# Patient Record
Sex: MALE | Race: WHITE | NOT HISPANIC OR LATINO | Employment: OTHER | URBAN - METROPOLITAN AREA
[De-identification: names, ages, dates, MRNs, and addresses within clinical notes are randomized per-mention and may not be internally consistent; named-entity substitution may affect disease eponyms.]

---

## 2023-09-06 PROBLEM — I21.9 MYOCARDIAL INFARCTION (MULTI): Status: ACTIVE | Noted: 2023-09-06

## 2023-09-06 PROBLEM — R09.89 ALTERED CARDIOPULMONARY TISSUE PERFUSION: Status: ACTIVE | Noted: 2023-09-06

## 2023-09-06 PROBLEM — I42.9 CARDIOMYOPATHY (MULTI): Status: ACTIVE | Noted: 2023-09-06

## 2023-09-06 PROBLEM — F31.9 BIPOLAR DISORDER (MULTI): Status: ACTIVE | Noted: 2020-03-25

## 2023-09-06 PROBLEM — N18.9 CKD (CHRONIC KIDNEY DISEASE): Status: ACTIVE | Noted: 2021-08-30

## 2023-09-06 PROBLEM — R06.02 SOB (SHORTNESS OF BREATH) ON EXERTION: Status: ACTIVE | Noted: 2019-07-15

## 2023-09-06 PROBLEM — E87.5 SERUM POTASSIUM ELEVATED: Status: ACTIVE | Noted: 2020-03-20

## 2023-09-06 PROBLEM — I50.9 CHRONIC CHF (MULTI): Status: ACTIVE | Noted: 2019-07-15

## 2023-09-06 PROBLEM — Z95.810 IMPLANTABLE CARDIOVERTER-DEFIBRILLATOR (ICD) IN SITU: Status: ACTIVE | Noted: 2023-09-06

## 2023-09-06 PROBLEM — I63.9 CEREBROVASCULAR ACCIDENT (MULTI): Status: ACTIVE | Noted: 2023-09-06

## 2023-09-06 PROBLEM — I25.5 ISCHEMIC CARDIOMYOPATHY: Status: ACTIVE | Noted: 2023-09-06

## 2023-09-06 PROBLEM — R13.10 DYSPHAGIA: Status: ACTIVE | Noted: 2018-10-09

## 2023-09-06 PROBLEM — E11.9 DIABETES MELLITUS (MULTI): Status: ACTIVE | Noted: 2018-07-18

## 2023-09-06 PROBLEM — R73.09 BLOOD GLUCOSE ABNORMAL: Status: ACTIVE | Noted: 2023-09-06

## 2023-09-06 PROBLEM — K59.00 CONSTIPATION: Status: ACTIVE | Noted: 2018-10-16

## 2023-09-06 PROBLEM — K42.9 UMBILICAL HERNIA: Status: ACTIVE | Noted: 2022-03-17

## 2023-09-06 PROBLEM — I48.91 ATRIAL FIBRILLATION (MULTI): Status: ACTIVE | Noted: 2023-09-06

## 2023-09-06 PROBLEM — I50.9 CONGESTIVE HEART FAILURE (MULTI): Status: ACTIVE | Noted: 2023-09-06

## 2023-09-06 PROBLEM — H61.22 IMPACTED CERUMEN, LEFT EAR: Status: ACTIVE | Noted: 2018-10-16

## 2023-09-06 PROBLEM — Z95.5 HISTORY OF CORONARY ARTERY STENT PLACEMENT: Status: ACTIVE | Noted: 2023-09-06

## 2023-09-06 PROBLEM — I10 HTN (HYPERTENSION): Status: ACTIVE | Noted: 2023-03-24

## 2023-09-06 PROBLEM — R06.00 DYSPNEA: Status: ACTIVE | Noted: 2023-09-06

## 2023-09-06 PROBLEM — Z86.73 HISTORY OF CEREBROVASCULAR ACCIDENT: Status: ACTIVE | Noted: 2023-09-06

## 2023-09-06 PROBLEM — E78.00 ELEVATED CHOLESTEROL: Status: ACTIVE | Noted: 2020-03-20

## 2023-09-06 PROBLEM — I25.10 CORONARY ARTERIOSCLEROSIS: Status: ACTIVE | Noted: 2023-09-06

## 2023-09-06 PROBLEM — G93.9 DISORDER OF BRAIN: Status: ACTIVE | Noted: 2023-09-06

## 2023-09-06 RX ORDER — TORSEMIDE 20 MG/1
20 TABLET ORAL EVERY OTHER DAY
COMMUNITY
Start: 2019-08-06 | End: 2023-11-14 | Stop reason: SDUPTHER

## 2023-09-06 RX ORDER — SACUBITRIL AND VALSARTAN 97; 103 MG/1; MG/1
TABLET, FILM COATED ORAL
COMMUNITY
Start: 2018-10-09 | End: 2023-10-30

## 2023-09-06 RX ORDER — SPIRONOLACTONE 25 MG/1
25 TABLET ORAL
COMMUNITY
End: 2023-10-30

## 2023-09-06 RX ORDER — LAMOTRIGINE 200 MG/1
200 TABLET ORAL EVERY MORNING
COMMUNITY
Start: 2018-10-09

## 2023-09-06 RX ORDER — CHOLECALCIFEROL (VITAMIN D3) 50 MCG
TABLET ORAL
COMMUNITY

## 2023-09-06 RX ORDER — ATORVASTATIN CALCIUM 80 MG/1
80 TABLET, FILM COATED ORAL NIGHTLY
COMMUNITY
Start: 2018-10-09 | End: 2023-11-14 | Stop reason: SDUPTHER

## 2023-09-06 RX ORDER — CARVEDILOL 25 MG/1
25 TABLET ORAL 2 TIMES DAILY
COMMUNITY
Start: 2018-10-09 | End: 2024-03-05 | Stop reason: SDUPTHER

## 2023-09-06 RX ORDER — WARFARIN 6 MG/1
6 TABLET ORAL EVERY EVENING
COMMUNITY
Start: 2018-10-09 | End: 2023-11-14 | Stop reason: SDUPTHER

## 2023-09-06 RX ORDER — HYDROCHLOROTHIAZIDE 25 MG/1
25 TABLET ORAL DAILY
COMMUNITY
End: 2023-10-30

## 2023-09-06 RX ORDER — SACUBITRIL AND VALSARTAN 49; 51 MG/1; MG/1
1 TABLET, FILM COATED ORAL 2 TIMES DAILY
COMMUNITY
End: 2024-02-06 | Stop reason: SDUPTHER

## 2023-09-06 RX ORDER — DIAPER,BRIEF,ADULT, DISPOSABLE
EACH MISCELLANEOUS 2 TIMES DAILY
COMMUNITY
Start: 2019-12-06

## 2023-09-06 RX ORDER — LAMOTRIGINE 100 MG/1
100 TABLET ORAL DAILY
COMMUNITY
End: 2023-10-30

## 2023-09-14 DIAGNOSIS — I48.91 ATRIAL FIBRILLATION, UNSPECIFIED TYPE (MULTI): Primary | ICD-10-CM

## 2023-09-14 DIAGNOSIS — Z79.01 LONG TERM (CURRENT) USE OF ANTICOAGULANTS: ICD-10-CM

## 2023-09-14 LAB
INR IN PPP BY COAGULATION ASSAY EXTERNAL: 2.4
PROTHROMBIN TIME (PT) IN PPP BY COAGULATION ASSAY EXTERNAL: NORMAL SECONDS

## 2023-10-17 ENCOUNTER — LAB (OUTPATIENT)
Dept: LAB | Facility: LAB | Age: 78
End: 2023-10-17
Payer: MEDICARE

## 2023-10-17 DIAGNOSIS — E11.9 TYPE 2 DIABETES MELLITUS WITHOUT COMPLICATIONS (MULTI): Primary | ICD-10-CM

## 2023-10-17 DIAGNOSIS — I10 ESSENTIAL (PRIMARY) HYPERTENSION: ICD-10-CM

## 2023-10-17 DIAGNOSIS — N18.9 CHRONIC KIDNEY DISEASE, UNSPECIFIED: ICD-10-CM

## 2023-10-17 LAB
ALBUMIN SERPL-MCNC: 4.3 G/DL (ref 3.5–5)
ALP BLD-CCNC: 71 U/L (ref 35–125)
ALT SERPL-CCNC: 26 U/L (ref 5–40)
ANION GAP SERPL CALC-SCNC: 13 MMOL/L
APPEARANCE UR: CLEAR
AST SERPL-CCNC: 27 U/L (ref 5–40)
BILIRUB SERPL-MCNC: 0.6 MG/DL (ref 0.1–1.2)
BILIRUB UR STRIP.AUTO-MCNC: NEGATIVE MG/DL
BUN SERPL-MCNC: 37 MG/DL (ref 8–25)
CALCIUM SERPL-MCNC: 9.5 MG/DL (ref 8.5–10.4)
CHLORIDE SERPL-SCNC: 106 MMOL/L (ref 97–107)
CO2 SERPL-SCNC: 28 MMOL/L (ref 24–31)
COLOR UR: ABNORMAL
CREAT SERPL-MCNC: 2.2 MG/DL (ref 0.4–1.6)
EST. AVERAGE GLUCOSE BLD GHB EST-MCNC: 186 MG/DL
GFR SERPL CREATININE-BSD FRML MDRD: 30 ML/MIN/1.73M*2
GLUCOSE SERPL-MCNC: 174 MG/DL (ref 65–99)
GLUCOSE UR STRIP.AUTO-MCNC: NORMAL MG/DL
HBA1C MFR BLD: 8.1 %
HOLD SPECIMEN: NORMAL
KETONES UR STRIP.AUTO-MCNC: NEGATIVE MG/DL
LEUKOCYTE ESTERASE UR QL STRIP.AUTO: ABNORMAL
NITRITE UR QL STRIP.AUTO: NEGATIVE
PH UR STRIP.AUTO: 6.5 [PH]
POTASSIUM SERPL-SCNC: 4.9 MMOL/L (ref 3.4–5.1)
PROT SERPL-MCNC: 6.2 G/DL (ref 5.9–7.9)
PROT UR STRIP.AUTO-MCNC: ABNORMAL MG/DL
RBC # UR STRIP.AUTO: NEGATIVE /UL
RBC #/AREA URNS AUTO: NORMAL /HPF
SODIUM SERPL-SCNC: 147 MMOL/L (ref 133–145)
SP GR UR STRIP.AUTO: 1.01
UROBILINOGEN UR STRIP.AUTO-MCNC: NORMAL MG/DL
WBC #/AREA URNS AUTO: NORMAL /HPF

## 2023-10-17 PROCEDURE — 80053 COMPREHEN METABOLIC PANEL: CPT

## 2023-10-17 PROCEDURE — 81001 URINALYSIS AUTO W/SCOPE: CPT

## 2023-10-17 PROCEDURE — 36415 COLL VENOUS BLD VENIPUNCTURE: CPT

## 2023-10-17 PROCEDURE — 87086 URINE CULTURE/COLONY COUNT: CPT

## 2023-10-17 PROCEDURE — 83036 HEMOGLOBIN GLYCOSYLATED A1C: CPT

## 2023-10-19 ENCOUNTER — ANTICOAGULATION - WARFARIN VISIT (OUTPATIENT)
Dept: CARDIOLOGY | Facility: CLINIC | Age: 78
End: 2023-10-19
Payer: MEDICARE

## 2023-10-19 DIAGNOSIS — I48.0 PAROXYSMAL ATRIAL FIBRILLATION (MULTI): Primary | ICD-10-CM

## 2023-10-19 PROBLEM — Z79.01 LONG TERM (CURRENT) USE OF ANTICOAGULANTS: Status: ACTIVE | Noted: 2023-10-19

## 2023-10-19 LAB — BACTERIA UR CULT: NORMAL

## 2023-10-20 ENCOUNTER — HOSPITAL ENCOUNTER (OUTPATIENT)
Dept: CARDIOLOGY | Facility: CLINIC | Age: 78
Discharge: HOME | End: 2023-10-20
Payer: MEDICARE

## 2023-10-20 PROCEDURE — 93295 DEV INTERROG REMOTE 1/2/MLT: CPT | Performed by: INTERNAL MEDICINE

## 2023-10-20 PROCEDURE — 93296 REM INTERROG EVL PM/IDS: CPT

## 2023-10-26 ENCOUNTER — ANTICOAGULATION - WARFARIN VISIT (OUTPATIENT)
Dept: CARDIOLOGY | Facility: CLINIC | Age: 78
End: 2023-10-26
Payer: MEDICARE

## 2023-10-26 DIAGNOSIS — Z79.01 LONG TERM (CURRENT) USE OF ANTICOAGULANTS: ICD-10-CM

## 2023-10-26 DIAGNOSIS — I48.0 PAROXYSMAL ATRIAL FIBRILLATION (MULTI): ICD-10-CM

## 2023-10-26 DIAGNOSIS — I48.91 ATRIAL FIBRILLATION, UNSPECIFIED TYPE (MULTI): ICD-10-CM

## 2023-10-26 LAB
POC INR: 2.5
POC PROTHROMBIN TIME: NORMAL

## 2023-10-26 PROCEDURE — 85610 PROTHROMBIN TIME: CPT | Mod: QW

## 2023-10-26 PROCEDURE — 99211 OFF/OP EST MAY X REQ PHY/QHP: CPT | Mod: PO,27 | Performed by: INTERNAL MEDICINE

## 2023-10-26 NOTE — PROGRESS NOTES
Patient identification verified with 2 identifiers.    Location: [facility] - [address] [place] [address] [phone]     Referring Physician: DR STAUFFER  Enrollment/ Re-enrollment date: 10/24   INR Goal: 2.0-3.0  INR monitoring is per [facility] protocol.  Anticoagulation Medication: warfarin  Indication: Atrial Fibrillation/Atrial Flutter    Subjective   Bleeding signs/symptoms: No    Bruising: No   Major bleeding event: No  Thrombosis signs/symptoms: No  Thromboembolic event: No  Missed doses: No  Extra doses: No  Medication changes: No  Dietary changes: No  Change in health: No  Change in activity: No  Alcohol: No  Other concerns: No    Upcoming Surgeries:  Does the Patient Have any upcoming surgeries that require interruption in anticoagulation therapy? no  Does the patient require bridging? no      Anticoagulation Summary  As of 10/26/2023      INR goal:  2.0-3.0   TTR:  --   INR used for dosing:     Weekly warfarin total:  42 mg               Assessment/Plan   Therapeutic     1. New dose: no change    2. Next INR: 1 month      Education provided to patient during the visit:  Patient instructed to call in interim with questions, concerns and changes.   Patient educated on signs of bleeding/clotting.

## 2023-10-30 ENCOUNTER — OFFICE VISIT (OUTPATIENT)
Dept: PRIMARY CARE | Facility: CLINIC | Age: 78
End: 2023-10-30
Payer: MEDICARE

## 2023-10-30 ENCOUNTER — HOSPITAL ENCOUNTER (OUTPATIENT)
Dept: CARDIOLOGY | Facility: CLINIC | Age: 78
Discharge: HOME | End: 2023-10-30
Payer: MEDICARE

## 2023-10-30 ENCOUNTER — TELEPHONE (OUTPATIENT)
Dept: PRIMARY CARE | Facility: CLINIC | Age: 78
End: 2023-10-30

## 2023-10-30 VITALS
HEART RATE: 72 BPM | BODY MASS INDEX: 27.26 KG/M2 | SYSTOLIC BLOOD PRESSURE: 120 MMHG | DIASTOLIC BLOOD PRESSURE: 60 MMHG | OXYGEN SATURATION: 97 % | WEIGHT: 190 LBS | RESPIRATION RATE: 16 BRPM

## 2023-10-30 DIAGNOSIS — I25.5 ISCHEMIC CARDIOMYOPATHY: ICD-10-CM

## 2023-10-30 DIAGNOSIS — I48.0 PAROXYSMAL ATRIAL FIBRILLATION (MULTI): ICD-10-CM

## 2023-10-30 DIAGNOSIS — E11.9 TYPE 2 DIABETES MELLITUS WITHOUT COMPLICATION, WITHOUT LONG-TERM CURRENT USE OF INSULIN (MULTI): ICD-10-CM

## 2023-10-30 DIAGNOSIS — E78.00 ELEVATED CHOLESTEROL: ICD-10-CM

## 2023-10-30 DIAGNOSIS — I48.0 AF (PAROXYSMAL ATRIAL FIBRILLATION) (MULTI): ICD-10-CM

## 2023-10-30 DIAGNOSIS — I10 PRIMARY HYPERTENSION: ICD-10-CM

## 2023-10-30 DIAGNOSIS — F31.60 BIPOLAR AFFECTIVE DISORDER, CURRENT EPISODE MIXED, CURRENT EPISODE SEVERITY UNSPECIFIED (MULTI): Primary | ICD-10-CM

## 2023-10-30 DIAGNOSIS — N18.31 STAGE 3A CHRONIC KIDNEY DISEASE (MULTI): ICD-10-CM

## 2023-10-30 DIAGNOSIS — Z95.810 ICD (IMPLANTABLE CARDIOVERTER-DEFIBRILLATOR) IN PLACE: ICD-10-CM

## 2023-10-30 PROBLEM — R06.00 DYSPNEA: Status: RESOLVED | Noted: 2023-09-06 | Resolved: 2023-10-30

## 2023-10-30 PROBLEM — R09.89 ALTERED CARDIOPULMONARY TISSUE PERFUSION: Status: RESOLVED | Noted: 2023-09-06 | Resolved: 2023-10-30

## 2023-10-30 PROBLEM — R13.10 DYSPHAGIA: Status: RESOLVED | Noted: 2018-10-09 | Resolved: 2023-10-30

## 2023-10-30 PROBLEM — I50.9 CONGESTIVE HEART FAILURE (MULTI): Status: RESOLVED | Noted: 2023-09-06 | Resolved: 2023-10-30

## 2023-10-30 PROBLEM — E87.5 SERUM POTASSIUM ELEVATED: Status: RESOLVED | Noted: 2020-03-20 | Resolved: 2023-10-30

## 2023-10-30 PROBLEM — I21.9 MYOCARDIAL INFARCTION (MULTI): Status: RESOLVED | Noted: 2023-09-06 | Resolved: 2023-10-30

## 2023-10-30 PROBLEM — R73.09 BLOOD GLUCOSE ABNORMAL: Status: RESOLVED | Noted: 2023-09-06 | Resolved: 2023-10-30

## 2023-10-30 PROBLEM — H61.22 IMPACTED CERUMEN, LEFT EAR: Status: RESOLVED | Noted: 2018-10-16 | Resolved: 2023-10-30

## 2023-10-30 PROBLEM — G93.9 DISORDER OF BRAIN: Status: RESOLVED | Noted: 2023-09-06 | Resolved: 2023-10-30

## 2023-10-30 PROBLEM — R06.02 SOB (SHORTNESS OF BREATH) ON EXERTION: Status: RESOLVED | Noted: 2019-07-15 | Resolved: 2023-10-30

## 2023-10-30 PROBLEM — Z86.73 HISTORY OF CEREBROVASCULAR ACCIDENT: Status: RESOLVED | Noted: 2023-09-06 | Resolved: 2023-10-30

## 2023-10-30 PROCEDURE — 3074F SYST BP LT 130 MM HG: CPT | Performed by: FAMILY MEDICINE

## 2023-10-30 PROCEDURE — 1126F AMNT PAIN NOTED NONE PRSNT: CPT | Performed by: FAMILY MEDICINE

## 2023-10-30 PROCEDURE — 3078F DIAST BP <80 MM HG: CPT | Performed by: FAMILY MEDICINE

## 2023-10-30 PROCEDURE — 1036F TOBACCO NON-USER: CPT | Performed by: FAMILY MEDICINE

## 2023-10-30 PROCEDURE — 93296 REM INTERROG EVL PM/IDS: CPT

## 2023-10-30 PROCEDURE — 1159F MED LIST DOCD IN RCRD: CPT | Performed by: FAMILY MEDICINE

## 2023-10-30 PROCEDURE — 99214 OFFICE O/P EST MOD 30 MIN: CPT | Performed by: FAMILY MEDICINE

## 2023-10-30 RX ORDER — DAPAGLIFLOZIN 10 MG/1
10 TABLET, FILM COATED ORAL DAILY
Qty: 30 TABLET | Refills: 3 | Status: SHIPPED | OUTPATIENT
Start: 2023-10-30 | End: 2024-10-29

## 2023-10-30 ASSESSMENT — PATIENT HEALTH QUESTIONNAIRE - PHQ9
SUM OF ALL RESPONSES TO PHQ9 QUESTIONS 1 AND 2: 0
2. FEELING DOWN, DEPRESSED OR HOPELESS: NOT AT ALL
1. LITTLE INTEREST OR PLEASURE IN DOING THINGS: NOT AT ALL

## 2023-10-30 ASSESSMENT — ENCOUNTER SYMPTOMS
LOSS OF SENSATION IN FEET: 0
OCCASIONAL FEELINGS OF UNSTEADINESS: 0
DEPRESSION: 0

## 2023-10-30 ASSESSMENT — PAIN SCALES - GENERAL: PAINLEVEL: 0-NO PAIN

## 2023-10-30 NOTE — PROGRESS NOTES
Subjective   Patient ID: Lj Valenzuela is a 78 y.o. male who presents for Diabetes.    HPI   1. LJ is seen today for bipolar disorder.  He is on Lamotrigine. He is followed by Dr. Ruiz.     2. LJ is seen also for follow up of Diabetes 2, diet controlled.  He is on no meds. Recent A1C is 8.1.     3. LJ is seen today also for follow up of High cholesterol.  He is on atorvastatin 80 mg.      4. LJ is here also for follow up of CHF and essential hypertension.  He is on spironolactone, carvedilol, torsemide and entresto  through Dr. Whitaker . He saw Dr. Cox (Defibrillator placed in 11/14).      5. LJ is seen today also for follow up of coronary artery disease.  He had a stent placed by Dr. Medrano in 7/08. He had an MI in 5/10 (admitted at [facility]). He is on Coumadin     6. LJ is seen today also for tremors.  He has had a tremor for several years that has become more noticeable over the past few years. He saw Dr. Zapata and Dr. Ruiz (psychiatrist) who stopped his lithium and started him on Lamictal.  He had a hemorrhagic stroke in 6/16 and treated at [facility]. He had been on Coumadin which was stopped and subsequently restarted. He has had no residual effects.     7. LJ is seen today for renal insufficiency His Creatinine has chronically been 1.6 - 2.0.  Most recent CREAT is 2.2.    Review of Systems  Denies headache, blurred vision, chest pain, shortness of breath, nausea or vomiting, change in bowel habits or leg pain or swelling.    Objective   /60 (BP Location: Left arm, Patient Position: Sitting, BP Cuff Size: Large adult)   Pulse 72   Resp 16   Wt 86.2 kg (190 lb)   SpO2 97%   BMI 27.26 kg/m²     Physical Exam  Vitals and nurse's notes reviewed  General: no acute distress  HEENT: Normal  Neck: Supple  Lungs: Clear  Cardio: RRR w/o murmur  Extremities: No edema, no calf tenderness  Neuro: Alert and oriented with no focal deficits    Assessment/Plan   Problem List  Items Addressed This Visit             ICD-10-CM       High    HTN (hypertension) I10     Continue current medication and following with his cardiologist.         Atrial fibrillation (CMS/Formerly [facility]) I48.91     Continue current medication and following with cardiologist.         Bipolar disorder (CMS/Formerly [facility]) - Primary F31.9     Continue current medication and following with his psychiatrist.         Cardiomyopathy (CMS/Formerly [facility]) I42.9    CKD (chronic kidney disease) N18.9     Creatinine has progressed to be higher than in the past.  Will refer to nephrologist Dr. Figueredo for further evaluation.         Relevant Orders    Referral to Nephrology    Diabetes mellitus (CMS/Formerly [facility]) E11.9     We discussed his elevated sugar in detail.  He admits to eating sweets.  I recommend he cut back on this.  Also will start Farxiga 10 daily.  Follow-up here in 3 months for recheck at which time we will recheck A1c.         Relevant Medications    dapagliflozin propanediol (Farxiga) 10 mg    Elevated cholesterol E78.00     Continue current medication.  We will check lipid panel in 6 months [place] [facility]

## 2023-10-30 NOTE — ASSESSMENT & PLAN NOTE
Creatinine has progressed to be higher than in the past.  Will refer to nephrologist Dr. Figueredo for further evaluation.

## 2023-10-30 NOTE — ASSESSMENT & PLAN NOTE
We discussed his elevated sugar in detail.  He admits to eating sweets.  I recommend he cut back on this.  Also will start Farxiga 10 daily.  Follow-up here in 3 months for recheck at which time we will recheck A1c.

## 2023-11-14 ENCOUNTER — OFFICE VISIT (OUTPATIENT)
Dept: CARDIOLOGY | Facility: CLINIC | Age: 78
End: 2023-11-14
Payer: MEDICARE

## 2023-11-14 VITALS
OXYGEN SATURATION: 96 % | HEART RATE: 65 BPM | TEMPERATURE: 98.6 F | WEIGHT: 186 LBS | RESPIRATION RATE: 14 BRPM | SYSTOLIC BLOOD PRESSURE: 110 MMHG | DIASTOLIC BLOOD PRESSURE: 80 MMHG | HEIGHT: 70 IN | BODY MASS INDEX: 26.63 KG/M2

## 2023-11-14 DIAGNOSIS — I48.0 PAROXYSMAL ATRIAL FIBRILLATION (MULTI): ICD-10-CM

## 2023-11-14 DIAGNOSIS — E78.2 MIXED HYPERLIPIDEMIA: ICD-10-CM

## 2023-11-14 DIAGNOSIS — I42.9 SECONDARY CARDIOMYOPATHY (MULTI): ICD-10-CM

## 2023-11-14 DIAGNOSIS — I51.3 LEFT VENTRICULAR THROMBUS: ICD-10-CM

## 2023-11-14 DIAGNOSIS — R06.02 SOB (SHORTNESS OF BREATH) ON EXERTION: ICD-10-CM

## 2023-11-14 DIAGNOSIS — I50.9 CHF (CONGESTIVE HEART FAILURE) (MULTI): Primary | ICD-10-CM

## 2023-11-14 PROBLEM — I50.22 CHRONIC SYSTOLIC HEART FAILURE (MULTI): Status: ACTIVE | Noted: 2023-09-06

## 2023-11-14 PROBLEM — I48.20 CHRONIC ATRIAL FIBRILLATION (MULTI): Status: ACTIVE | Noted: 2023-09-06

## 2023-11-14 PROBLEM — I20.9 ANGINA PECTORIS (CMS-HCC): Status: ACTIVE | Noted: 2023-09-06

## 2023-11-14 PROCEDURE — 1159F MED LIST DOCD IN RCRD: CPT | Performed by: INTERNAL MEDICINE

## 2023-11-14 PROCEDURE — 99214 OFFICE O/P EST MOD 30 MIN: CPT | Performed by: INTERNAL MEDICINE

## 2023-11-14 PROCEDURE — 3074F SYST BP LT 130 MM HG: CPT | Performed by: INTERNAL MEDICINE

## 2023-11-14 PROCEDURE — 1036F TOBACCO NON-USER: CPT | Performed by: INTERNAL MEDICINE

## 2023-11-14 PROCEDURE — 1126F AMNT PAIN NOTED NONE PRSNT: CPT | Performed by: INTERNAL MEDICINE

## 2023-11-14 PROCEDURE — 3079F DIAST BP 80-89 MM HG: CPT | Performed by: INTERNAL MEDICINE

## 2023-11-14 RX ORDER — TORSEMIDE 20 MG/1
20 TABLET ORAL 2 TIMES DAILY
Qty: 60 TABLET | Refills: 11 | Status: SHIPPED | OUTPATIENT
Start: 2023-11-14 | End: 2024-11-13

## 2023-11-14 RX ORDER — ATORVASTATIN CALCIUM 80 MG/1
80 TABLET, FILM COATED ORAL NIGHTLY
Qty: 90 TABLET | Refills: 3 | Status: SHIPPED | OUTPATIENT
Start: 2023-11-14 | End: 2024-11-13

## 2023-11-14 RX ORDER — FAMOTIDINE 20 MG/1
20 TABLET, FILM COATED ORAL NIGHTLY PRN
COMMUNITY
End: 2024-02-08 | Stop reason: ALTCHOICE

## 2023-11-14 RX ORDER — WARFARIN 6 MG/1
6 TABLET ORAL EVERY EVENING
Qty: 90 TABLET | Refills: 3 | Status: SHIPPED | OUTPATIENT
Start: 2023-11-14 | End: 2024-11-13

## 2023-11-14 ASSESSMENT — PAIN SCALES - GENERAL: PAINLEVEL: 0-NO PAIN

## 2023-11-14 NOTE — PROGRESS NOTES
History of present illness:  78 year old male patient here today following up on hx of ischemic Cardiomyopathy/AFIB, and heart failure with systolic dysfunction. LHC in 2008 showed severe RCA disease which was treated with DELIA, 2D Echo on 2019 showed EF of 30% status post ICD. (NYHA class I-II). Cardiac catheterization in 10/2019 showed stable CAD. Patient returns to my office for follow-up.  Office for follow-up.  Has been feeling overall great since we increase his torsemide to 20 mg oral twice daily.  Has been also on Farxiga for better sugar control.  Denies any chest pain palpitations or dizziness.  Still has slight mild lower extremity edema.  Past Medical History:   Diagnosis Date    AICD (automatic cardioverter/defibrillator) present 05/29/2016    Formatting of this note might be different from the original. Patient reports that this was placed and he does have history of atrial fibrillation.    Angina pectoris (CMS/Formerly [facility]) 09/06/2023    Basal ganglia hemorrhage (CMS/[facility]ly [facility]) 05/29/2016    Formatting of this note might be different from the original. - L sided subcortical hemorrhage. ICH score of 0. - Etiology is hypertensive and coagulopathy. - Repeat CT scan in 6 hours for stability.    Chronic atrial fibrillation (CMS/[facility]ly [facility]) 09/06/2023    Chronic systolic heart failure (CMS/[facility]ly [facility]) 09/06/2023    CKD (chronic kidney disease) 08/30/2021    Coronary artery disease 05/29/2016    Diabetes mellitus (CMS/[facility]ly [facility]) 07/18/2018    Essential hypertension 03/24/2023    History of coronary artery stent placement 09/06/2023    Left ventricular thrombus 06/01/2016    Formatting of this note might be different from the original. 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 His smoking use included cigarettes. He smoked an average of 1 pack per day. He has been exposed to tobacco smoke. He has never used smokeless tobacco. He reports that he does not currently use alcohol. He reports that he does not use drugs.    Family History   Problem Relation Name Age of Onset    Other (accident) Father         Patient's Medications   New Prescriptions    No medications on file   Previous Medications    ATORVASTATIN (LIPITOR) 80 MG TABLET    Take 1 tablet (80 mg) by mouth once daily at bedtime.    BLOOD SUGAR DIAGNOSTIC (TRUETRACK TEST) STRIP    2 times a day.    CARVEDILOL (COREG) 25 MG TABLET    Take 1 tablet (25 mg) by mouth 2 times a day.    CHOLECALCIFEROL (VITAMIN D3) 50 MCG (2000 UT) TABLET        DAPAGLIFLOZIN PROPANEDIOL (FARXIGA) 10 MG    Take 1 tablet (10 mg) by mouth once daily.    EMPAGLIFLOZIN (JARDIANCE) 10 MG    Take 1 tablet (10 mg) by mouth once daily.    FAMOTIDINE (PEPCID) 20 MG TABLET    Take 1 tablet (20 mg) by mouth as needed at bedtime.    LAMOTRIGINE (LAMICTAL) 200 MG TABLET    Take 1 tablet (200 mg) by mouth once daily in the morning.    RANITIDINE HCL ORAL    Take 150 mg by mouth once daily at bedtime.    SACUBITRIL-VALSARTAN (ENTRESTO) 49-51 MG TABLET    Take 1 tablet by mouth 2 times a day.    TORSEMIDE (DEMADEX) 20 MG TABLET    Take 1 tablet (20 mg) by mouth every other day.    WARFARIN (JANTOVEN) 6 MG TABLET    Take 1 tablet (6 mg) by mouth once daily in the evening.   Modified Medications    No medications on file   Discontinued Medications    No medications on file       Objective   Physical Exam  General: Patient in no acute distress   HEENT: Atraumatic normocephalic.  Neck: Supple, jugular venous pressure within normal limit.  No  bruits  Lungs: Clear to auscultation bilaterally  Cardiovascular: Regular rate and rhythm, normal heart sounds, no murmurs rubs or gallops  Abdomen: Soft nontender nondistended.  Normal bowel sounds.  Extremities: Warm to touch, no edema.      Lab Review   Anticoagulation - Warfarin Visit on 10/26/2023   Component Date Value    POC INR 10/26/2023 2.50    Lab on 10/17/2023   Component Date Value    Color, Urine 10/17/2023 Light-Yellow     Appearance, Urine 10/17/2023 Clear     Specific Gravity, Urine 10/17/2023 1.010     pH, Urine 10/17/2023 6.5     Protein, Urine 10/17/2023 20 (TRACE)     Glucose, Urine 10/17/2023 Normal     Blood, Urine 10/17/2023 NEGATIVE     Ketones, Urine 10/17/2023 NEGATIVE     Bilirubin, Urine 10/17/2023 NEGATIVE     Urobilinogen, Urine 10/17/2023 Normal     Nitrite, Urine 10/17/2023 NEGATIVE     Leukocyte Esterase, Urine 10/17/2023 25 Edis/µL (A)     Extra Tube 10/17/2023 Hold for add-ons.     Glucose 10/17/2023 174 (H)     Sodium 10/17/2023 147 (H)     Potassium 10/17/2023 4.9     Chloride 10/17/2023 106     Bicarbonate 10/17/2023 28     Urea Nitrogen 10/17/2023 37 (H)     Creatinine 10/17/2023 2.20 (H)     eGFR 10/17/2023 30 (L)     Calcium 10/17/2023 9.5     Albumin 10/17/2023 4.3     Alkaline Phosphatase 10/17/2023 71     Total Protein 10/17/2023 6.2     AST 10/17/2023 27     Bilirubin, Total 10/17/2023 0.6     ALT 10/17/2023 26     Anion Gap 10/17/2023 13     Hemoglobin A1C 10/17/2023 8.1 (H)     Estimated Average Glucose 10/17/2023 186     WBC, Urine 10/17/2023 NONE     RBC, Urine 10/17/2023 1-2     Urine Culture 10/17/2023 No significant growth         Assessment/Plan   Patient Active Problem List   Diagnosis    Essential hypertension    Chronic atrial fibrillation (CMS/HCC)    Bipolar disorder (CMS/HCC)    Secondary cardiomyopathy (CMS/HCC)    Stroke (cerebrum) (CMS/HCC)    Chronic CHF (CMS/HCC)    Chronic systolic heart failure (CMS/HCC)    CKD (chronic kidney disease)    Constipation     Coronary artery disease    Diabetes mellitus (CMS/HCC)    Mixed hyperlipidemia    History of coronary artery stent placement    AICD (automatic cardioverter/defibrillator) present    Ischemic cardiomyopathy    Angina pectoris (CMS/HCC)    Umbilical hernia    Long term (current) use of anticoagulants    Paroxysmal atrial fibrillation (CMS/HCC)    Basal ganglia hemorrhage (CMS/HCC)    Dysarthria    Left ventricular thrombus        78 year old male patient here today following up on hx of ischemic Cardiomyopathy/AFIB, and heart failure with systolic dysfunction. LHC in 2008 showed severe RCA disease which was treated with DELIA, 2D Echo on 2019 showed EF of 30% status post ICD. (NYHA class I-II). Cardiac catheterization in 10/2019 showed stable CAD. Patient returns to my office for follow-up.  Office for follow-up.  Has been feeling overall great since we increase his torsemide to 20 mg oral twice daily.  Has been also on Farxiga for better sugar control.  Denies any chest pain palpitations or dizziness.  Still has slight mild lower extremity edema.  Reviewed last labs his creatinine is 2.2 potassium stable.  He is supposed to see Dr. Vidales and few weeks.  He has ordered repeated blood work before seeing him.  From my standpoint he is actually on optimal medical therapy we will continue current medications we will follow-up in 3 months.  Patient otherwise stable.    Rosamaria Seymour MD

## 2023-11-30 ENCOUNTER — ANTICOAGULATION - WARFARIN VISIT (OUTPATIENT)
Dept: CARDIOLOGY | Facility: CLINIC | Age: 78
End: 2023-11-30
Payer: MEDICARE

## 2023-11-30 DIAGNOSIS — Z79.01 LONG TERM (CURRENT) USE OF ANTICOAGULANTS: ICD-10-CM

## 2023-11-30 DIAGNOSIS — I48.0 PAROXYSMAL ATRIAL FIBRILLATION (MULTI): ICD-10-CM

## 2023-11-30 DIAGNOSIS — I48.20 CHRONIC ATRIAL FIBRILLATION (MULTI): ICD-10-CM

## 2023-11-30 LAB
POC INR: 3.4
POC PROTHROMBIN TIME: NORMAL

## 2023-11-30 PROCEDURE — 99211 OFF/OP EST MAY X REQ PHY/QHP: CPT | Performed by: INTERNAL MEDICINE

## 2023-11-30 PROCEDURE — 85610 PROTHROMBIN TIME: CPT | Mod: QW

## 2023-11-30 NOTE — PROGRESS NOTES
Patient identification verified with 2 identifiers.    Location: [facility] - [address] [place] [address] [phone]     Referring Physician: DR STAUFFER  Enrollment/ Re-enrollment date: 10/24   INR Goal: 2.0-3.0  INR monitoring is per [facility] protocol.  Anticoagulation Medication: warfarin  Indication: Atrial Fibrillation/Atrial Flutter    Subjective   Bleeding signs/symptoms: No    Bruising: No   Major bleeding event: No  Thrombosis signs/symptoms: No  Thromboembolic event: No  Missed doses: No  Extra doses: No  Medication changes: No  Dietary changes: Yes  decrease in K  Change in health: No  Change in activity: No  Alcohol: No  Other concerns: No    Upcoming Surgeries:  Does the Patient Have any upcoming surgeries that require interruption in anticoagulation therapy? no  Does the patient require bridging? no      Anticoagulation Summary  As of 11/30/2023      INR goal:  2.0-3.0   TTR:  76.9 % (2.2 mo)   INR used for dosing:  3.40 (11/30/2023)   Weekly warfarin total:  42 mg               Assessment/Plan     1. New dose: no change    2. Next INR: 2 weeks      Education provided to patient during the visit:  Patient instructed to call in interim with questions, concerns and changes.   Patient educated on signs of bleeding/clotting.

## 2023-12-14 ENCOUNTER — APPOINTMENT (OUTPATIENT)
Dept: CARDIOLOGY | Facility: CLINIC | Age: 78
End: 2023-12-14
Payer: MEDICARE

## 2023-12-18 ENCOUNTER — ANTICOAGULATION - WARFARIN VISIT (OUTPATIENT)
Dept: CARDIOLOGY | Facility: CLINIC | Age: 78
End: 2023-12-18
Payer: MEDICARE

## 2023-12-18 DIAGNOSIS — I48.20 CHRONIC ATRIAL FIBRILLATION (MULTI): ICD-10-CM

## 2023-12-18 DIAGNOSIS — Z79.01 LONG TERM (CURRENT) USE OF ANTICOAGULANTS: ICD-10-CM

## 2023-12-18 DIAGNOSIS — I48.0 PAROXYSMAL ATRIAL FIBRILLATION (MULTI): ICD-10-CM

## 2023-12-18 LAB
POC INR: 2.6
POC PROTHROMBIN TIME: NORMAL

## 2023-12-18 PROCEDURE — 99211 OFF/OP EST MAY X REQ PHY/QHP: CPT | Mod: 27 | Performed by: INTERNAL MEDICINE

## 2023-12-18 PROCEDURE — 85610 PROTHROMBIN TIME: CPT | Mod: QW

## 2023-12-18 NOTE — PROGRESS NOTES
Patient identification verified with 2 identifiers.    Location: [facility] - [address] [place] [address] [phone]     Referring Physician: DR STAUFFER  Enrollment/ Re-enrollment date: 10/24   INR Goal: 2.0-3.0  INR monitoring is per [facility] protocol.  Anticoagulation Medication: warfarin  Indication: Atrial Fibrillation/Atrial Flutter    Subjective   Bleeding signs/symptoms: No    Bruising: No   Major bleeding event: No  Thrombosis signs/symptoms: No  Thromboembolic event: No  Missed doses: No  Extra doses: No  Medication changes: No  Dietary changes: No  Change in health: No  Change in activity: No  Alcohol: No  Other concerns: No    Upcoming Surgeries:  Does the Patient Have any upcoming surgeries that require interruption in anticoagulation therapy? no  Does the patient require bridging? no      Anticoagulation Summary  As of 2023      INR goal:  2.0-3.0   TTR:  71.2 % (2.8 mo)   INR used for dosin.60 (2023)   Weekly warfarin total:  42 mg               Assessment/Plan     1. New dose: no change    2. Next INR: 4 weeks      Education provided to patient during the visit:  Patient instructed to call in interim with questions, concerns and changes.   Patient educated on signs of bleeding/clotting.

## 2024-01-09 ENCOUNTER — APPOINTMENT (OUTPATIENT)
Dept: CARDIOLOGY | Facility: CLINIC | Age: 79
End: 2024-01-09
Payer: MEDICARE

## 2024-01-15 ENCOUNTER — ANTICOAGULATION - WARFARIN VISIT (OUTPATIENT)
Dept: CARDIOLOGY | Facility: CLINIC | Age: 79
End: 2024-01-15
Payer: MEDICARE

## 2024-01-15 DIAGNOSIS — Z79.01 LONG TERM (CURRENT) USE OF ANTICOAGULANTS: ICD-10-CM

## 2024-01-15 DIAGNOSIS — I48.20 CHRONIC ATRIAL FIBRILLATION (MULTI): ICD-10-CM

## 2024-01-15 DIAGNOSIS — I48.0 PAROXYSMAL ATRIAL FIBRILLATION (MULTI): ICD-10-CM

## 2024-01-15 LAB
POC INR: 3
POC PROTHROMBIN TIME: NORMAL

## 2024-01-15 PROCEDURE — 85610 PROTHROMBIN TIME: CPT | Mod: QW

## 2024-01-15 PROCEDURE — 99211 OFF/OP EST MAY X REQ PHY/QHP: CPT | Performed by: INTERNAL MEDICINE

## 2024-01-15 NOTE — PROGRESS NOTES
Patient identification verified with 2 identifiers.    Location: [facility] - [address] [place] [address] [phone]     Referring Physician: DR STAUFFER  Enrollment/ Re-enrollment date: 10/24   INR Goal: 2.0-3.0  INR monitoring is per [facility] protocol.  Anticoagulation Medication: warfarin  Indication: Atrial Fibrillation/Atrial Flutter    Subjective   Bleeding signs/symptoms: No    Bruising: No   Major bleeding event: No  Thrombosis signs/symptoms: No  Thromboembolic event: No  Missed doses: No  Extra doses: No  Medication changes: No  Dietary changes: No  Change in health: No  Change in activity: No  Alcohol: No  Other concerns: No    Upcoming Surgeries:  Does the Patient Have any upcoming surgeries that require interruption in anticoagulation therapy? no  Does the patient require bridging? no      Anticoagulation Summary  As of 1/15/2024      INR goal:  2.0-3.0   TTR:  78.3 % (3.8 mo)   INR used for dosing:  3.00 (1/15/2024)   Weekly warfarin total:  42 mg               Assessment/Plan     1. New dose: no change    2. Next INR: 4 weeks      Education provided to patient during the visit:  Patient instructed to call in interim with questions, concerns and changes.   Patient educated on signs of bleeding/clotting.

## 2024-01-22 ENCOUNTER — HOSPITAL ENCOUNTER (OUTPATIENT)
Dept: CARDIOLOGY | Facility: CLINIC | Age: 79
Discharge: HOME | End: 2024-01-22
Payer: MEDICARE

## 2024-01-22 PROCEDURE — 93296 REM INTERROG EVL PM/IDS: CPT

## 2024-01-22 PROCEDURE — 93295 DEV INTERROG REMOTE 1/2/MLT: CPT | Performed by: INTERNAL MEDICINE

## 2024-01-23 ENCOUNTER — HOSPITAL ENCOUNTER (OUTPATIENT)
Dept: CARDIOLOGY | Facility: CLINIC | Age: 79
Discharge: HOME | End: 2024-01-23
Payer: MEDICARE

## 2024-01-23 DIAGNOSIS — I48.0 PAF (PAROXYSMAL ATRIAL FIBRILLATION) (MULTI): ICD-10-CM

## 2024-01-23 DIAGNOSIS — Z95.810 PRESENCE OF AUTOMATIC CARDIOVERTER/DEFIBRILLATOR (AICD): ICD-10-CM

## 2024-01-23 DIAGNOSIS — I25.5 ISCHEMIC CARDIOMYOPATHY: ICD-10-CM

## 2024-01-23 PROCEDURE — 93296 REM INTERROG EVL PM/IDS: CPT

## 2024-01-29 ENCOUNTER — HOSPITAL ENCOUNTER (OUTPATIENT)
Dept: CARDIOLOGY | Facility: CLINIC | Age: 79
Discharge: HOME | End: 2024-01-29
Payer: MEDICARE

## 2024-01-29 DIAGNOSIS — Z95.810 ICD (IMPLANTABLE CARDIOVERTER-DEFIBRILLATOR) IN PLACE: ICD-10-CM

## 2024-01-29 DIAGNOSIS — I42.9 CARDIOMYOPATHY, UNSPECIFIED TYPE (MULTI): ICD-10-CM

## 2024-01-29 PROCEDURE — 93296 REM INTERROG EVL PM/IDS: CPT

## 2024-01-30 ENCOUNTER — LAB (OUTPATIENT)
Dept: LAB | Facility: LAB | Age: 79
End: 2024-01-30
Payer: MEDICARE

## 2024-01-30 DIAGNOSIS — E78.00 ELEVATED CHOLESTEROL: ICD-10-CM

## 2024-01-30 DIAGNOSIS — E11.9 TYPE 2 DIABETES MELLITUS WITHOUT COMPLICATION, WITHOUT LONG-TERM CURRENT USE OF INSULIN (MULTI): ICD-10-CM

## 2024-01-30 DIAGNOSIS — I10 PRIMARY HYPERTENSION: ICD-10-CM

## 2024-01-30 LAB
ALBUMIN SERPL-MCNC: 4.4 G/DL (ref 3.5–5)
ALP BLD-CCNC: 70 U/L (ref 35–125)
ALT SERPL-CCNC: 13 U/L (ref 5–40)
ANION GAP SERPL CALC-SCNC: 16 MMOL/L
APPEARANCE UR: CLEAR
AST SERPL-CCNC: 11 U/L (ref 5–40)
BACTERIA #/AREA URNS AUTO: ABNORMAL /HPF
BILIRUB SERPL-MCNC: 0.6 MG/DL (ref 0.1–1.2)
BILIRUB UR STRIP.AUTO-MCNC: NEGATIVE MG/DL
BUN SERPL-MCNC: 46 MG/DL (ref 8–25)
CALCIUM SERPL-MCNC: 9.2 MG/DL (ref 8.5–10.4)
CHLORIDE SERPL-SCNC: 106 MMOL/L (ref 97–107)
CHOLEST SERPL-MCNC: 116 MG/DL (ref 133–200)
CHOLEST/HDLC SERPL: 4 {RATIO}
CO2 SERPL-SCNC: 25 MMOL/L (ref 24–31)
COLOR UR: ABNORMAL
CREAT SERPL-MCNC: 2.6 MG/DL (ref 0.4–1.6)
CREAT UR-MCNC: 69 MG/DL
EGFRCR SERPLBLD CKD-EPI 2021: 24 ML/MIN/1.73M*2
EST. AVERAGE GLUCOSE BLD GHB EST-MCNC: 166 MG/DL
GLUCOSE SERPL-MCNC: 146 MG/DL (ref 65–99)
GLUCOSE UR STRIP.AUTO-MCNC: ABNORMAL MG/DL
HBA1C MFR BLD: 7.4 %
HDLC SERPL-MCNC: 29 MG/DL
HOLD SPECIMEN: NORMAL
KETONES UR STRIP.AUTO-MCNC: NEGATIVE MG/DL
LDLC SERPL CALC-MCNC: 54 MG/DL (ref 65–130)
LEUKOCYTE ESTERASE UR QL STRIP.AUTO: ABNORMAL
MICROALBUMIN UR-MCNC: 99 MG/L (ref 0–23)
MICROALBUMIN/CREAT UR: 143.5 UG/MG CREAT
NITRITE UR QL STRIP.AUTO: NEGATIVE
PH UR STRIP.AUTO: 6.5 [PH]
POTASSIUM SERPL-SCNC: 4.4 MMOL/L (ref 3.4–5.1)
PROT SERPL-MCNC: 6.4 G/DL (ref 5.9–7.9)
PROT UR STRIP.AUTO-MCNC: ABNORMAL MG/DL
RBC # UR STRIP.AUTO: ABNORMAL /UL
RBC #/AREA URNS AUTO: ABNORMAL /HPF
SODIUM SERPL-SCNC: 147 MMOL/L (ref 133–145)
SP GR UR STRIP.AUTO: 1.01
TRIGL SERPL-MCNC: 167 MG/DL (ref 40–150)
UROBILINOGEN UR STRIP.AUTO-MCNC: NORMAL MG/DL
WBC #/AREA URNS AUTO: ABNORMAL /HPF

## 2024-01-30 PROCEDURE — 87086 URINE CULTURE/COLONY COUNT: CPT

## 2024-01-30 PROCEDURE — 82570 ASSAY OF URINE CREATININE: CPT

## 2024-01-30 PROCEDURE — 80061 LIPID PANEL: CPT

## 2024-01-30 PROCEDURE — 80053 COMPREHEN METABOLIC PANEL: CPT

## 2024-01-30 PROCEDURE — 83036 HEMOGLOBIN GLYCOSYLATED A1C: CPT

## 2024-01-30 PROCEDURE — 82043 UR ALBUMIN QUANTITATIVE: CPT

## 2024-01-30 PROCEDURE — 36415 COLL VENOUS BLD VENIPUNCTURE: CPT

## 2024-01-30 PROCEDURE — 81001 URINALYSIS AUTO W/SCOPE: CPT

## 2024-01-31 ENCOUNTER — APPOINTMENT (OUTPATIENT)
Dept: OTOLARYNGOLOGY | Facility: CLINIC | Age: 79
End: 2024-01-31
Payer: MEDICARE

## 2024-01-31 LAB — BACTERIA UR CULT: NORMAL

## 2024-02-01 ENCOUNTER — OFFICE VISIT (OUTPATIENT)
Dept: OTOLARYNGOLOGY | Facility: CLINIC | Age: 79
End: 2024-02-01
Payer: MEDICARE

## 2024-02-01 ENCOUNTER — CLINICAL SUPPORT (OUTPATIENT)
Dept: AUDIOLOGY | Facility: CLINIC | Age: 79
End: 2024-02-01
Payer: MEDICARE

## 2024-02-01 VITALS — HEIGHT: 70 IN | TEMPERATURE: 96.9 F | WEIGHT: 190.6 LBS | BODY MASS INDEX: 27.29 KG/M2

## 2024-02-01 DIAGNOSIS — H90.0 CONDUCTIVE HEARING LOSS, BILATERAL: ICD-10-CM

## 2024-02-01 DIAGNOSIS — H90.3 SENSORINEURAL HEARING LOSS (SNHL) OF BOTH EARS: Primary | ICD-10-CM

## 2024-02-01 DIAGNOSIS — H61.23 BILATERAL IMPACTED CERUMEN: Primary | ICD-10-CM

## 2024-02-01 PROCEDURE — 92550 TYMPANOMETRY & REFLEX THRESH: CPT | Performed by: AUDIOLOGIST

## 2024-02-01 PROCEDURE — 1159F MED LIST DOCD IN RCRD: CPT | Performed by: OTOLARYNGOLOGY

## 2024-02-01 PROCEDURE — 1036F TOBACCO NON-USER: CPT | Performed by: OTOLARYNGOLOGY

## 2024-02-01 PROCEDURE — 92557 COMPREHENSIVE HEARING TEST: CPT | Performed by: AUDIOLOGIST

## 2024-02-01 PROCEDURE — 69210 REMOVE IMPACTED EAR WAX UNI: CPT | Performed by: OTOLARYNGOLOGY

## 2024-02-01 PROCEDURE — 99203 OFFICE O/P NEW LOW 30 MIN: CPT | Performed by: OTOLARYNGOLOGY

## 2024-02-01 PROCEDURE — 1126F AMNT PAIN NOTED NONE PRSNT: CPT | Performed by: OTOLARYNGOLOGY

## 2024-02-01 NOTE — PROGRESS NOTES
AUDIOLOGY ADULT AUDIOMETRIC EVALUATION    Name:  Lj Valenzuela  :  1945  Age:  78 y.o.  Date of Evaluation:  2024    Reason for visit: Patient is seen in the clinic today at the request of otolaryngology for an audiologic evaluation post cerumen removal.      HISTORY  Patient reports that the t.v. has had to be turned up.   He reports a gradual decrease in hearing.   Hearing a little better post cerumen removal.   He reports an occasional lightheaded dizziness; fell in a parking lot about 6 weeks ago; no significant injuries.        EVALUATION  See scanned audiogram: “Media” > “Audiology Report”.      RESULTS  Otoscopic Evaluation:  Right Ear: clear ear canal  Left Ear: clear ear canal    Immittance Measures:  Tympanometry:  Right Ear: Type A, rounded tympanic membrane mobility with normal middle ear pressure  Left Ear: Type A, normal tympanic membrane mobility with normal middle ear pressure    Acoustic Reflexes:  Ipsilateral Right Ear:  Absent acoustic reflexes  Ipsilateral Left Ear: Acoustic reflexes present within normal limits 500Hz through 4KHz   Contralateral Right Ear: did not evaluate  Contralateral Left Ear: did not evaluate    Distortion Product Otoacoustic Emissions (DPOAEs):  Right Ear: Passed 1500Hz-2KHz; 5KHz; Refer at 1KHz, 3KHz-4KHz, 6KHz-8KHz   Left Ear: passed at 5KHz; Refer at all other frequencies    Audiometry:  Test Technique and Reliability:   Standard audiometry via supra-aural headphones. Reliability is good.    Pure tone air and bone conduction audiometry:  Borderline normal hearing sensitivity 500Hz-2KHz sloping to a mild to moderate sensorineural hearing loss  At 3KHz and above bilaterally    Speech Audiometry (Word Recognition Scores):   Right Ear: Excellent at most comfortable listening level of loudness   Left Ear: Excellent at most comfortable listening level of loudness     IMPRESSIONS    Mild to moderate sensorineural hearing loss 3KHz and above  bilaterally    RECOMMENDATIONS  - Follow up with otolaryngology today as scheduled.  - Annual audiologic evaluation, sooner if an acute change is noted.    PATIENT EDUCATION  Discussed results, impressions and recommendations with the patient. Questions were addressed and the patient was encouraged to contact our office should concerns arise.    Time for this encounter: [id number]/[id number]

## 2024-02-01 NOTE — PROGRESS NOTES
Chief Complaint   Patient presents with    New Patient Visit     NP HEARING LOSS, AUDIO AFTERWARDS     HPI:  Lj Valenzuela is a 78 y.o. male complains of some intermittent left-sided hearing loss and ear pressure.  No pain, drainage, dizziness.  Seen by me prior to having an audiogram performed today at which time I cleaned out significant bilateral wax impactions obstructing view of the tympanic membrane.  Audiogram does show some mild to moderate symmetric sensorineural hearing loss    PMH:  Past Medical History:   Diagnosis Date    AICD (automatic cardioverter/defibrillator) present 05/29/2016    Formatting of this note might be different from the original. Patient reports that this was placed and he does have history of atrial fibrillation.    Angina pectoris (CMS/[facility]) 09/06/2023    Basal ganglia hemorrhage (CMS/[facility]) 05/29/2016    Formatting of this note might be different from the original. - L sided subcortical hemorrhage. ICH score of 0. - Etiology is hypertensive and coagulopathy. - Repeat CT scan in 6 hours for stability.    Cancer (CMS/[facility])     Chronic atrial fibrillation (CMS/[facility]) 09/06/2023    Chronic systolic heart failure (CMS/[facility]) 09/06/2023    CKD (chronic kidney disease) 08/30/2021    Coronary artery disease 05/29/2016    Diabetes mellitus (CMS/[facility]) 07/18/2018    Essential hypertension 03/24/2023    History of coronary artery stent placement 09/06/2023    Hypertension     Left ventricular thrombus 06/01/2016    Formatting of this note might be different from the original. Recommend resuming anticoagulation when safe from an ICH standpoint    Long term (current) use of anticoagulants 10/19/2023    Mixed hyperlipidemia 03/20/2020    Paroxysmal atrial fibrillation (CMS/HCC) 10/19/2023    Secondary cardiomyopathy (CMS/HCC) 09/06/2023    Stroke (cerebrum) (CMS/[facility]) 05/29/2016     Past Surgical History:   Procedure Laterality Date    CT GUIDED IMAGING FOR NEEDLE PLACEMENT  7/5/2023    CT GUIDED IMAGING  "FOR NEEDLE PLACEMENT [facility]         Medications:     Current Outpatient Medications:     atorvastatin (Lipitor) 80 mg tablet, Take 1 tablet (80 mg) by mouth once daily at bedtime., Disp: 90 tablet, Rfl: 3    blood sugar diagnostic (Truetrack Test) strip, 2 times a day., Disp: , Rfl:     carvedilol (Coreg) 25 mg tablet, Take 1 tablet (25 mg) by mouth 2 times a day., Disp: , Rfl:     cholecalciferol (Vitamin D3) 50 MCG (2000 UT) tablet, , Disp: , Rfl:     dapagliflozin propanediol (Farxiga) 10 mg, Take 1 tablet (10 mg) by mouth once daily., Disp: 30 tablet, Rfl: 3    lamoTRIgine (LaMICtal) 200 mg tablet, Take 1 tablet (200 mg) by mouth once daily in the morning., Disp: , Rfl:     sacubitriL-valsartan (Entresto) 49-51 mg tablet, Take 1 tablet by mouth 2 times a day., Disp: , Rfl:     empagliflozin (Jardiance) 10 mg, Take 1 tablet (10 mg) by mouth once daily., Disp: , Rfl:     famotidine (Pepcid) 20 mg tablet, Take 1 tablet (20 mg) by mouth as needed at bedtime., Disp: , Rfl:     RANITIDINE HCL ORAL, Take 150 mg by mouth once daily at bedtime., Disp: , Rfl:     torsemide (Demadex) 20 mg tablet, Take 1 tablet (20 mg) by mouth 2 times a day., Disp: 60 tablet, Rfl: 11    warfarin (Jantoven) 6 mg tablet, Take 1 tablet (6 mg) by mouth once daily in the evening., Disp: 90 tablet, Rfl: 3     Allergies:  No Known Allergies     ROS:  Review of systems normal unless stated otherwise in the HPI and/or PMH.    Physical Exam:  Temperature 36.1 °C (96.9 °F), height 1.778 m (5' 10\"), weight 86.5 kg (190 lb 9.6 oz). Body mass index is 27.35 kg/m².     GENERAL APPEARANCE: Well developed and well nourished.  Alert and oriented in no acute distress.  Normal vocal quality.      HEAD/FACE: No erythema or edema or facial tenderness.  Normal facial nerve function bilaterally.    EAR:       EXTERNAL: Normal pinnas and bilateral obstructive cerumen impaction was removed by myself with instrumentation using the operating microscope. "  Normal pinnas and external auditory canals after cleaning.       MIDDLE EAR: Tympanic membranes intact and mobile with normal landmarks.  Middle ear space appears well aerated.       TUBE STATUS: N/A       MASTOID CAVITY: N/A       HEARING: Gross hearing assessment is within normal limits.      NOSE:       VISUALIZED USING: Anterior rhinoscopy with headlight and nasal speculum.       DORSUM: Midline, nontraumatic appearance.       MUCOSA: Normal-appearing.       SECRETIONS: Normal.       SEPTUM: Midline and nonobstructing.       INFERIOR TURBINATES: Normal.       MIDDLE TURBINATES/MEATUS: N/A       BLEEDING: N/A         ORAL CAVITY/PHARYNX:       TEETH: Adequate dentition.       TONGUE: No mass or lesion.  Normal mobility.       FLOOR OF MOUTH: No mass or lesion.       PALATE: Normal hard palate, soft palate, and uvula.       OROPHARYNX: Normal without mass or lesion.       BUCCAL MUCOSA/GBS: Normal without mass or lesion.       LIPS: Normal.    LARYNX/HYPOPHARYNX/NASOPHARYNX: N/A    NECK: No palpable masses or abnormal adenopathy.  Trachea is midline.    THYROID: No thyromegaly or palpable nodule.    SALIVARY GLANDS: Normal bilateral parotid and submandibular glands by inspection and palpation.    TMJ's: Normal.    NEURO: Cranial nerve exam grossly normal bilaterally.       Assessment/Plan   Lj was seen today for new patient visit.  Diagnoses and all orders for this visit:  Bilateral impacted cerumen (Primary)  Conductive hearing loss, bilateral     Bilateral wax impactions cleaned today by myself.  He tolerated this well with improvement in his hearing especially on the left-hand side.  He does have some bilateral symmetric sensorineural hearing loss.  He is not interested in any hearing aid evaluation at this time.  Provided education.  Follow up if symptoms worsen or fail to improve.     Edu Gomes MD

## 2024-02-05 PROBLEM — I42.9 CARDIOMYOPATHY (MULTI): Status: ACTIVE | Noted: 2022-11-18

## 2024-02-05 PROBLEM — N18.30 CHRONIC KIDNEY DISEASE, STAGE 3 UNSPECIFIED (MULTI): Status: ACTIVE | Noted: 2021-08-30

## 2024-02-05 PROBLEM — S60.00XA CONTUSION OF FINGER OF RIGHT HAND, INITIAL ENCOUNTER: Status: ACTIVE | Noted: 2023-09-26

## 2024-02-05 PROBLEM — N28.89 OTHER SPECIFIED DISORDERS OF KIDNEY AND URETER: Status: ACTIVE | Noted: 2023-05-11

## 2024-02-05 PROBLEM — N17.9 ACUTE RENAL FAILURE (CMS-HCC): Status: ACTIVE | Noted: 2024-02-05

## 2024-02-05 PROBLEM — Z87.891 PERSONAL HISTORY OF NICOTINE DEPENDENCE: Status: ACTIVE | Noted: 2023-07-05

## 2024-02-05 PROBLEM — I13.0 HYPERTENSIVE HEART AND CHRONIC KIDNEY DISEASE WITH HEART FAILURE AND STAGE 1 THROUGH STAGE 4 CHRONIC KIDNEY DISEASE, OR UNSPECIFIED CHRONIC KIDNEY DISEASE (MULTI): Status: ACTIVE | Noted: 2023-07-05

## 2024-02-05 PROBLEM — D41.02 NEOPLASM OF UNCERTAIN BEHAVIOR OF LEFT KIDNEY: Status: ACTIVE | Noted: 2023-06-28

## 2024-02-05 PROBLEM — D30.02 BENIGN NEOPLASM OF LEFT KIDNEY: Status: ACTIVE | Noted: 2023-07-05

## 2024-02-05 PROBLEM — Z95.5 PRESENCE OF CORONARY ANGIOPLASTY IMPLANT AND GRAFT: Status: ACTIVE | Noted: 2023-07-05

## 2024-02-05 PROBLEM — I48.91 ATRIAL FIBRILLATION (MULTI): Status: ACTIVE | Noted: 2023-07-05

## 2024-02-06 ENCOUNTER — OFFICE VISIT (OUTPATIENT)
Dept: CARDIOLOGY | Facility: CLINIC | Age: 79
End: 2024-02-06
Payer: MEDICARE

## 2024-02-06 VITALS
BODY MASS INDEX: 26.94 KG/M2 | DIASTOLIC BLOOD PRESSURE: 66 MMHG | SYSTOLIC BLOOD PRESSURE: 115 MMHG | WEIGHT: 192.4 LBS | RESPIRATION RATE: 18 BRPM | HEART RATE: 61 BPM | OXYGEN SATURATION: 98 % | TEMPERATURE: 98.9 F | HEIGHT: 71 IN

## 2024-02-06 DIAGNOSIS — N18.32 STAGE 3B CHRONIC KIDNEY DISEASE (MULTI): ICD-10-CM

## 2024-02-06 DIAGNOSIS — I48.20 CHRONIC ATRIAL FIBRILLATION (MULTI): ICD-10-CM

## 2024-02-06 DIAGNOSIS — I50.20 SYSTOLIC CONGESTIVE HEART FAILURE, UNSPECIFIED HF CHRONICITY (MULTI): Primary | ICD-10-CM

## 2024-02-06 PROCEDURE — 3078F DIAST BP <80 MM HG: CPT | Performed by: INTERNAL MEDICINE

## 2024-02-06 PROCEDURE — 1036F TOBACCO NON-USER: CPT | Performed by: INTERNAL MEDICINE

## 2024-02-06 PROCEDURE — 3074F SYST BP LT 130 MM HG: CPT | Performed by: INTERNAL MEDICINE

## 2024-02-06 PROCEDURE — 1159F MED LIST DOCD IN RCRD: CPT | Performed by: INTERNAL MEDICINE

## 2024-02-06 PROCEDURE — 99214 OFFICE O/P EST MOD 30 MIN: CPT | Performed by: INTERNAL MEDICINE

## 2024-02-06 PROCEDURE — 93000 ELECTROCARDIOGRAM COMPLETE: CPT | Performed by: INTERNAL MEDICINE

## 2024-02-06 PROCEDURE — 1126F AMNT PAIN NOTED NONE PRSNT: CPT | Performed by: INTERNAL MEDICINE

## 2024-02-06 RX ORDER — SACUBITRIL AND VALSARTAN 49; 51 MG/1; MG/1
0.5 TABLET, FILM COATED ORAL 2 TIMES DAILY
Start: 2024-02-06 | End: 2024-04-25

## 2024-02-06 ASSESSMENT — PAIN SCALES - GENERAL: PAINLEVEL: 0-NO PAIN

## 2024-02-06 NOTE — PROGRESS NOTES
History of present illness:    Past Medical History:   Diagnosis Date    AICD (automatic cardioverter/defibrillator) present 05/29/2016    Formatting of this note might be different from the original. Patient reports that this was placed and he does have history of atrial fibrillation.    Angina pectoris (CMS/[facility]) 09/06/2023    Basal ganglia hemorrhage (CMS/[facility]) 05/29/2016    Formatting of this note might be different from the original. - L sided subcortical hemorrhage. ICH score of 0. - Etiology is hypertensive and coagulopathy. - Repeat CT scan in 6 hours for stability.    Cancer (CMS/[facility])     Chronic atrial fibrillation (CMS/[facility]) 09/06/2023    Chronic systolic heart failure (CMS/[facility]) 09/06/2023    CKD (chronic kidney disease) 08/30/2021    Coronary artery disease 05/29/2016    Diabetes mellitus (CMS/[facility]) 07/18/2018    Essential hypertension 03/24/2023    History of coronary artery stent placement 09/06/2023    Hypertension     Left ventricular thrombus 06/01/2016    Formatting of this note might be different from the original. Recommend resuming anticoagulation when safe from an ICH standpoint    Long term (current) use of anticoagulants 10/19/2023    Mixed hyperlipidemia 03/20/2020    Paroxysmal atrial fibrillation (CMS/[facility]) 10/19/2023    Secondary cardiomyopathy (CMS/[facility]) 09/06/2023    Stroke (cerebrum) (CMS/[facility]) 05/29/2016       Past Surgical History:   Procedure Laterality Date    CT GUIDED IMAGING FOR NEEDLE PLACEMENT  7/5/2023    CT GUIDED IMAGING FOR NEEDLE PLACEMENT [facility]       No Known Allergies     reports that he has quit smoking. His smoking use included cigarettes. He smoked an average of 1 pack per day. He has been exposed to tobacco smoke. He has never used smokeless tobacco. He reports that he does not currently use alcohol. He reports that he does not use drugs.    Family History   Problem Relation Name Age of Onset    Other (accident) Father         Patient's Medications   New  Prescriptions    No medications on file   Previous Medications    ATORVASTATIN (LIPITOR) 80 MG TABLET    Take 1 tablet (80 mg) by mouth once daily at bedtime.    BLOOD SUGAR DIAGNOSTIC (TRUETRACK TEST) STRIP    2 times a day.    CARVEDILOL (COREG) 25 MG TABLET    Take 1 tablet (25 mg) by mouth 2 times a day.    CHOLECALCIFEROL (VITAMIN D3) 50 MCG (2000 UT) TABLET        DAPAGLIFLOZIN PROPANEDIOL (FARXIGA) 10 MG    Take 1 tablet (10 mg) by mouth once daily.    EMPAGLIFLOZIN (JARDIANCE) 10 MG    Take 1 tablet (10 mg) by mouth once daily.    FAMOTIDINE (PEPCID) 20 MG TABLET    Take 1 tablet (20 mg) by mouth as needed at bedtime.    LAMOTRIGINE (LAMICTAL) 200 MG TABLET    Take 1 tablet (200 mg) by mouth once daily in the morning.    RANITIDINE HCL ORAL    Take 150 mg by mouth once daily at bedtime.    SACUBITRIL-VALSARTAN (ENTRESTO) 49-51 MG TABLET    Take 1 tablet by mouth 2 times a day.    TORSEMIDE (DEMADEX) 20 MG TABLET    Take 1 tablet (20 mg) by mouth 2 times a day.    WARFARIN (JANTOVEN) 6 MG TABLET    Take 1 tablet (6 mg) by mouth once daily in the evening.   Modified Medications    No medications on file   Discontinued Medications    No medications on file       Objective   Physical Exam  General: Patient in no acute distress   HEENT: Atraumatic normocephalic.  Neck: Supple, jugular venous pressure within normal limit.  No bruits  Lungs: Clear to auscultation bilaterally  Cardiovascular: Regular rate and rhythm, normal heart sounds, no murmurs rubs or gallops  Abdomen: Soft nontender nondistended.  Normal bowel sounds.  Extremities: Warm to touch, no edema.    Lab Review   Lab on 01/30/2024   Component Date Value    Hemoglobin A1C 01/30/2024 7.4 (H)     Estimated Average Glucose 01/30/2024 166     Glucose 01/30/2024 146 (H)     Sodium 01/30/2024 147 (H)     Potassium 01/30/2024 4.4     Chloride 01/30/2024 106     Bicarbonate 01/30/2024 25     Urea Nitrogen 01/30/2024 46 (H)     Creatinine 01/30/2024 2.60 (H)      eGFR 01/30/2024 24 (L)     Calcium 01/30/2024 9.2     Albumin 01/30/2024 4.4     Alkaline Phosphatase 01/30/2024 70     Total Protein 01/30/2024 6.4     AST 01/30/2024 11     Bilirubin, Total 01/30/2024 0.6     ALT 01/30/2024 13     Anion Gap 01/30/2024 16     Albumin, Urine Random 01/30/2024 99.0 (H)     Creatinine, Urine Random 01/30/2024 69.0     Albumin/Creatine Ratio 01/30/2024 143.5     Cholesterol 01/30/2024 116 (L)     HDL-Cholesterol 01/30/2024 29.0 (L)     Cholesterol/HDL Ratio 01/30/2024 4.0     LDL Calculated 01/30/2024 54 (L)     Triglycerides 01/30/2024 167 (H)     Color, Urine 01/30/2024 Light-Yellow     Appearance, Urine 01/30/2024 Clear     Specific Gravity, Urine 01/30/2024 1.010     pH, Urine 01/30/2024 6.5     Protein, Urine 01/30/2024 10 (TRACE)     Glucose, Urine 01/30/2024 300 (3+) (A)     Blood, Urine 01/30/2024 0.1 (1+) (A)     Ketones, Urine 01/30/2024 NEGATIVE     Bilirubin, Urine 01/30/2024 NEGATIVE     Urobilinogen, Urine 01/30/2024 Normal     Nitrite, Urine 01/30/2024 NEGATIVE     Leukocyte Esterase, Urine 01/30/2024 250 Edis/µL (A)     Extra Tube 01/30/2024 Hold for add-ons.     WBC, Urine 01/30/2024 6-10 (A)     RBC, Urine 01/30/2024 3-5     Bacteria, Urine 01/30/2024 1+ (A)     Urine Culture 01/30/2024 No significant growth    Anticoagulation - Warfarin Visit on 01/15/2024   Component Date Value    POC INR 01/15/2024 3.00    Anticoagulation - Warfarin Visit on 12/18/2023   Component Date Value    POC INR 12/18/2023 2.60         Assessment/Plan   Patient Active Problem List   Diagnosis    Primary hypertension    Chronic atrial fibrillation (CMS/HCC)    Bipolar disorder (CMS/HCC)    Cardiomyopathy (CMS/HCC)    Cerebrovascular accident (CMS/HCC)    Chronic CHF (CMS/HCC)    Chronic systolic heart failure (CMS/HCC)    Chronic kidney disease, stage 3 unspecified (CMS/HCC)    Constipation    Coronary artery disease    Diabetes mellitus (CMS/HCC)    Hypercholesterolemia    History of  coronary artery stent placement    Implantable cardioverter-defibrillator (ICD) in situ    Generalized ischemic myocardial dysfunction    Angina pectoris (CMS/HCC)    Umbilical hernia    Long term (current) use of anticoagulants    Atrial fibrillation (CMS/HCC)    Basal ganglia hemorrhage (CMS/HCC)    Dysarthria    Left ventricular thrombus    Acute renal failure (CMS/HCC)    Presence of coronary angioplasty implant and graft    Personal history of nicotine dependence    Other specified disorders of kidney and ureter    Neoplasm of uncertain behavior of left kidney    Hypertensive heart and chronic kidney disease with heart failure and stage 1 through stage 4 chronic kidney disease, or unspecified chronic kidney disease (CMS/HCC)    Contusion of finger of right hand, initial encounter    Benign neoplasm of left kidney      This is a very pleasant 79-year-old male with history of ischemic cardiomyopathy ejection fraction of 20-25%, status post ICD, NYHA class II-III. patient returns to my office for follow-up.  Has been feeling much better since has been on the Farxiga and has been taking it every other day.  Lower extremity edema has improved.  Denies having chest pain.  Has been feeling dizzy sometimes.  Blood pressure running on the low normal.  Reviewed his last kidney function creatinine is 2.6.  Has worsened slightly from baseline.  BUN slightly worse.  Could be all related to Farxiga.  At this point my recommendation actually to decrease Entresto since patient blood pressure running on the low side to half a pill twice daily.  We will obtain basic metabolic panel in 2 weeks.  If his renal function keeps worsening then we will stop Farxiga and we will also increase diuretics.  Follow-up in 3 months.  Rosamaria Seymour MD

## 2024-02-08 ENCOUNTER — TELEPHONE (OUTPATIENT)
Dept: PRIMARY CARE | Facility: CLINIC | Age: 79
End: 2024-02-08

## 2024-02-08 ENCOUNTER — OFFICE VISIT (OUTPATIENT)
Dept: PRIMARY CARE | Facility: CLINIC | Age: 79
End: 2024-02-08
Payer: MEDICARE

## 2024-02-08 VITALS
RESPIRATION RATE: 16 BRPM | OXYGEN SATURATION: 100 % | HEART RATE: 69 BPM | DIASTOLIC BLOOD PRESSURE: 62 MMHG | WEIGHT: 182 LBS | BODY MASS INDEX: 25.75 KG/M2 | SYSTOLIC BLOOD PRESSURE: 102 MMHG

## 2024-02-08 DIAGNOSIS — I10 PRIMARY HYPERTENSION: Primary | ICD-10-CM

## 2024-02-08 DIAGNOSIS — E78.00 HYPERCHOLESTEROLEMIA: ICD-10-CM

## 2024-02-08 DIAGNOSIS — Z79.899 MEDICATION MANAGEMENT: ICD-10-CM

## 2024-02-08 DIAGNOSIS — E11.9 TYPE 2 DIABETES MELLITUS WITHOUT COMPLICATION, WITHOUT LONG-TERM CURRENT USE OF INSULIN (MULTI): ICD-10-CM

## 2024-02-08 DIAGNOSIS — N18.31 STAGE 3A CHRONIC KIDNEY DISEASE (MULTI): ICD-10-CM

## 2024-02-08 DIAGNOSIS — I48.20 CHRONIC ATRIAL FIBRILLATION (MULTI): ICD-10-CM

## 2024-02-08 DIAGNOSIS — F31.11 BIPOLAR AFFECTIVE DISORDER, CURRENTLY MANIC, MILD (MULTI): ICD-10-CM

## 2024-02-08 DIAGNOSIS — I10 PRIMARY HYPERTENSION: ICD-10-CM

## 2024-02-08 PROCEDURE — 1170F FXNL STATUS ASSESSED: CPT | Performed by: FAMILY MEDICINE

## 2024-02-08 PROCEDURE — 1159F MED LIST DOCD IN RCRD: CPT | Performed by: FAMILY MEDICINE

## 2024-02-08 PROCEDURE — 99214 OFFICE O/P EST MOD 30 MIN: CPT | Performed by: FAMILY MEDICINE

## 2024-02-08 PROCEDURE — 3074F SYST BP LT 130 MM HG: CPT | Performed by: FAMILY MEDICINE

## 2024-02-08 PROCEDURE — 1036F TOBACCO NON-USER: CPT | Performed by: FAMILY MEDICINE

## 2024-02-08 PROCEDURE — 3078F DIAST BP <80 MM HG: CPT | Performed by: FAMILY MEDICINE

## 2024-02-08 PROCEDURE — 1126F AMNT PAIN NOTED NONE PRSNT: CPT | Performed by: FAMILY MEDICINE

## 2024-02-08 ASSESSMENT — ENCOUNTER SYMPTOMS
SWEATS: 0
HEADACHES: 0
OCCASIONAL FEELINGS OF UNSTEADINESS: 0
BLURRED VISION: 0
WEAKNESS: 0
VISUAL CHANGE: 0
DEPRESSION: 0
HUNGER: 0
SPEECH DIFFICULTY: 0
WEIGHT LOSS: 0
NERVOUS/ANXIOUS: 0
FATIGUE: 0
POLYPHAGIA: 0
CONFUSION: 0
TREMORS: 0
LOSS OF SENSATION IN FEET: 0
BLACKOUTS: 0
POLYDIPSIA: 0
SEIZURES: 0
DIZZINESS: 0

## 2024-02-08 ASSESSMENT — PAIN SCALES - GENERAL: PAINLEVEL: 0-NO PAIN

## 2024-02-08 ASSESSMENT — ACTIVITIES OF DAILY LIVING (ADL)
TOILETING: INDEPENDENT
BATHING: INDEPENDENT
DRESSING YOURSELF: INDEPENDENT
GROOMING: INDEPENDENT
HEARING - RIGHT EAR: FUNCTIONAL
FEEDING YOURSELF: INDEPENDENT
ASSISTIVE_DEVICE: NONE;DENTURES UPPER
JUDGMENT_ADEQUATE_SAFELY_COMPLETE_DAILY_ACTIVITIES: YES
HEARING - LEFT EAR: FUNCTIONAL
ADEQUATE_TO_COMPLETE_ADL: YES
PATIENT'S MEMORY ADEQUATE TO SAFELY COMPLETE DAILY ACTIVITIES?: YES
WALKS IN HOME: INDEPENDENT

## 2024-02-08 ASSESSMENT — PATIENT HEALTH QUESTIONNAIRE - PHQ9
1. LITTLE INTEREST OR PLEASURE IN DOING THINGS: NOT AT ALL
2. FEELING DOWN, DEPRESSED OR HOPELESS: NOT AT ALL
SUM OF ALL RESPONSES TO PHQ9 QUESTIONS 1 AND 2: 0

## 2024-02-08 NOTE — ASSESSMENT & PLAN NOTE
This appears to be progressive.  I did refer him to Dr. Wong before I noticed Dr. Seymour's note saying he had plan to cut back of Farxiga and recheck the BMP.

## 2024-02-08 NOTE — PROGRESS NOTES
Subjective   Patient ID: Lj Valenzuela is a 79 y.o. male who presents for Hypertension and Diabetes.    HPI   1. LJ is seen today for bipolar disorder.  He is on Lamotrigine. He is followed by Dr. Ruiz.     2. LJ is seen also for follow up of Diabetes 2, diet controlled.  He is on Farxiga (started 0/23). Recent A1C is 7.4 (down from 8.1).     3. LJ is seen today also for follow up of High cholesterol.  He is on atorvastatin 80 mg.      4. LJ is here also for follow up of CHF and essential hypertension.  He is on spironolactone, carvedilol, torsemide and entresto  through Dr. Whitaker . He saw Dr. Cox (Defibrillator placed in 11/14).      5. LJ is seen today also for follow up of coronary artery disease.  He had a stent placed by Dr. Medrano in 7/08. He had an MI in 5/10 (admitted at [facility]). He is on Coumadin     6. LJ is seen today also for tremors.  He has had a tremor for several years He saw Dr. Zapata and Dr. Ruiz (psychiatrist) who stopped his lithium and started him on Lamictal.  He had a hemorrhagic stroke in 6/16 and treated at [facility]. He had been on Coumadin which was stopped and subsequently restarted. He has had no residual effects.     7. LJ is seen today for renal insufficiency His Creatinine has chronically been 1.6 - 2.0.  Most recent CREAT is 2.6. I had referred him to a nephrologist Dr. Vidales 6 months ago but he has not seen him yet. He saw his cardiologist Dr. Seymour who noted the elevated creatinine.  He cut back his Farxiga and is going to recheck his BMP in 2 weeks.  (See his note from 2/6/2024).    8. He is also here for follow-up of BPH and renal tumor.  He is being followed by Dr. Smith.  They are following the renal tumor by CT scans.         Review of Systems  Denies headache, blurred vision, chest pain, shortness of breath, nausea or vomiting, change in bowel habits or leg pain or swelling.    Objective   /62 (BP Location: Left arm,  Patient Position: Sitting, BP Cuff Size: Large adult)   Pulse 69   Resp 16   Wt 82.6 kg (182 lb)   SpO2 100%   BMI 25.75 kg/m²     Physical Exam  Vitals and nurse's notes reviewed  General: no acute distress  HEENT: Normal  Neck: Supple  Lungs: Clear  Cardio: RRR w/o murmur  Extremities: No edema, no calf tenderness  Neuro: Alert and oriented with no focal deficits    Assessment/Plan   Problem List Items Addressed This Visit             ICD-10-CM       High    Primary hypertension - Primary I10     Continue spironolactone, carvedilol, torsemide and Entresto through his cardiologist Dr. Seymour.         Chronic atrial fibrillation (CMS/[facility]) I48.20     Continue following with cardiologist.         Bipolar disorder (CMS/[facility]) F31.9     Continue lamotrigine and following with his psychiatrist.         Chronic kidney disease, stage 3 unspecified (CMS/HCC) N18.30     This appears to be progressive.  I did refer him to Dr. Wong before I noticed Dr. Seymour's note saying he had plan to cut back of Farxiga and recheck the BMP.         Relevant Orders    Referral to Nephrology    Diabetes mellitus (CMS/[facility]) E11.9     Sugars have decreased since starting the Farxiga.  His Farxiga was cut back to every other day by cardiologist because of elevated creatinine.  Will continue with this regimen and have him continue to cut back on carbohydrates in his diet.  Will check A1c again in 6 months at [facility].         Hypercholesterolemia E78.00     Continue atorvastatin.

## 2024-02-08 NOTE — ASSESSMENT & PLAN NOTE
Sugars have decreased since starting the Farxiga.  His Farxiga was cut back to every other day by cardiologist because of elevated creatinine.  Will continue with this regimen and have him continue to cut back on carbohydrates in his diet.  Will check A1c again in 6 months at [facility].

## 2024-02-12 ENCOUNTER — ANTICOAGULATION - WARFARIN VISIT (OUTPATIENT)
Dept: CARDIOLOGY | Facility: CLINIC | Age: 79
End: 2024-02-12
Payer: MEDICARE

## 2024-02-12 DIAGNOSIS — I48.20 CHRONIC ATRIAL FIBRILLATION (MULTI): ICD-10-CM

## 2024-02-12 DIAGNOSIS — Z79.01 LONG TERM (CURRENT) USE OF ANTICOAGULANTS: ICD-10-CM

## 2024-02-12 DIAGNOSIS — I48.91 ATRIAL FIBRILLATION, UNSPECIFIED TYPE (MULTI): ICD-10-CM

## 2024-02-12 LAB
POC INR: 3.5
POC PROTHROMBIN TIME: NORMAL

## 2024-02-12 PROCEDURE — 85610 PROTHROMBIN TIME: CPT | Mod: QW

## 2024-02-12 PROCEDURE — 99211 OFF/OP EST MAY X REQ PHY/QHP: CPT | Performed by: INTERNAL MEDICINE

## 2024-02-12 NOTE — PROGRESS NOTES
Patient identification verified with 2 identifiers.    Location: [facility] - [address] [place] [address] [phone]     Referring Physician: DR STAUFFER  Enrollment/ Re-enrollment date: 10/24   INR Goal: 2.0-3.0  INR monitoring is per [facility] protocol.  Anticoagulation Medication: warfarin  Indication: Atrial Fibrillation/Atrial Flutter    Subjective   Bleeding signs/symptoms: No    Bruising: No   Major bleeding event: No  Thrombosis signs/symptoms: No  Thromboembolic event: No  Missed doses: No  Extra doses: No  Medication changes: No  Dietary changes: No  Change in health: No  Change in activity: No  Alcohol: No  Other concerns: No    Upcoming Surgeries:  Does the Patient Have any upcoming surgeries that require interruption in anticoagulation therapy? no  Does the patient require bridging? no      Anticoagulation Summary  As of 2/12/2024      INR goal:  2.0-3.0   TTR:  62.8 % (4.7 mo)   INR used for dosing:  3.50 (2/12/2024)   Weekly warfarin total:  39 mg               Assessment/Plan     1. New dose: hold one dose and decrease  2. Next INR:  1 weeks      Education provided to patient during the visit:  Patient instructed to call in interim with questions, concerns and changes.   Patient educated on signs of bleeding/clotting.

## 2024-02-19 ENCOUNTER — ANTICOAGULATION - WARFARIN VISIT (OUTPATIENT)
Dept: CARDIOLOGY | Facility: CLINIC | Age: 79
End: 2024-02-19
Payer: MEDICARE

## 2024-02-19 DIAGNOSIS — I48.20 CHRONIC ATRIAL FIBRILLATION (MULTI): ICD-10-CM

## 2024-02-19 DIAGNOSIS — Z79.01 LONG TERM (CURRENT) USE OF ANTICOAGULANTS: ICD-10-CM

## 2024-02-19 DIAGNOSIS — I48.91 ATRIAL FIBRILLATION, UNSPECIFIED TYPE (MULTI): ICD-10-CM

## 2024-02-19 LAB
POC INR: 1.8
POC PROTHROMBIN TIME: NORMAL

## 2024-02-19 PROCEDURE — 85610 PROTHROMBIN TIME: CPT | Mod: QW

## 2024-02-19 PROCEDURE — 99211 OFF/OP EST MAY X REQ PHY/QHP: CPT | Performed by: INTERNAL MEDICINE

## 2024-02-19 NOTE — PROGRESS NOTES
Patient identification verified with 2 identifiers.    Location: [facility] - [address] [place] [address] [phone]     Referring Physician: DR STAUFFER  Enrollment/ Re-enrollment date: 10/24   INR Goal: 2.0-3.0  INR monitoring is per [facility] protocol.  Anticoagulation Medication: warfarin  Indication: Atrial Fibrillation/Atrial Flutter    Subjective   Bleeding signs/symptoms: No    Bruising: No   Major bleeding event: No  Thrombosis signs/symptoms: No  Thromboembolic event: No  Missed doses: No  Extra doses: No  Medication changes: No  Dietary changes: No  Change in health: No  Change in activity: No  Alcohol: No  Other concerns: No    Upcoming Surgeries:  Does the Patient Have any upcoming surgeries that require interruption in anticoagulation therapy? no  Does the patient require bridging? no      Anticoagulation Summary  As of 2024      INR goal:  2.0-3.0   TTR:  62.6 % (4.9 mo)   INR used for dosin.80 (2024)   Weekly warfarin total:  42 mg               Assessment/Plan     1. New dose: INCREASE  2. Next INR:  1 weeks      Education provided to patient during the visit:  Patient instructed to call in interim with questions, concerns and changes.   Patient educated on signs of bleeding/clotting.

## 2024-02-26 ENCOUNTER — ANTICOAGULATION - WARFARIN VISIT (OUTPATIENT)
Dept: CARDIOLOGY | Facility: CLINIC | Age: 79
End: 2024-02-26
Payer: MEDICARE

## 2024-02-26 DIAGNOSIS — I48.91 ATRIAL FIBRILLATION, UNSPECIFIED TYPE (MULTI): ICD-10-CM

## 2024-02-26 DIAGNOSIS — Z79.01 LONG TERM (CURRENT) USE OF ANTICOAGULANTS: ICD-10-CM

## 2024-02-26 DIAGNOSIS — I48.20 CHRONIC ATRIAL FIBRILLATION (MULTI): ICD-10-CM

## 2024-02-26 LAB
POC INR: 2.7
POC PROTHROMBIN TIME: NORMAL

## 2024-02-26 PROCEDURE — 99211 OFF/OP EST MAY X REQ PHY/QHP: CPT | Performed by: INTERNAL MEDICINE

## 2024-02-26 PROCEDURE — 85610 PROTHROMBIN TIME: CPT | Mod: QW

## 2024-02-26 NOTE — PROGRESS NOTES
Patient identification verified with 2 identifiers.    Location: [facility][note 3[facility] - [address] [place] [address] [phone]     Referring Physician: DR STAUFFER  Enrollment/ Re-enrollment date: 10/24   INR Goal: 2.0-3.0  INR monitoring is per [facility] protocol.  Anticoagulation Medication: warfarin  Indication: Atrial Fibrillation/Atrial Flutter    Subjective   Bleeding signs/symptoms: No    Bruising: No   Major bleeding event: No  Thrombosis signs/symptoms: No  Thromboembolic event: No  Missed doses: No  Extra doses: No  Medication changes: No  Dietary changes: No  Change in health: No  Change in activity: No  Alcohol: No  Other concerns: No    Upcoming Surgeries:  Does the Patient Have any upcoming surgeries that require interruption in anticoagulation therapy? no  Does the patient require bridging? no      Anticoagulation Summary  As of 2024      INR goal:  2.0-3.0   TTR:  63.3 % (5.2 mo)   INR used for dosin.70 (2024)   Weekly warfarin total:  42 mg               Assessment/Plan   Therapeutic     1. New dose: no change    2. Next INR: 1 week      Education provided to patient during the visit:  Patient instructed to call in interim with questions, concerns and changes.   Patient educated on interactions between medications and warfarin.   Patient educated on dietary consistency in vitamin k consumption.   Patient educated on affects of alcohol consumption while taking warfarin.   Patient educated on signs of bleeding/clotting.   Patient educated on compliance with dosing, follow up appointments, and prescribed plan of care.

## 2024-03-04 ENCOUNTER — ANTICOAGULATION - WARFARIN VISIT (OUTPATIENT)
Dept: CARDIOLOGY | Facility: CLINIC | Age: 79
End: 2024-03-04
Payer: MEDICARE

## 2024-03-04 DIAGNOSIS — I48.91 ATRIAL FIBRILLATION, UNSPECIFIED TYPE (MULTI): ICD-10-CM

## 2024-03-04 DIAGNOSIS — Z79.01 LONG TERM (CURRENT) USE OF ANTICOAGULANTS: ICD-10-CM

## 2024-03-04 DIAGNOSIS — I48.20 CHRONIC ATRIAL FIBRILLATION (MULTI): ICD-10-CM

## 2024-03-04 LAB
POC INR: 2.7
POC PROTHROMBIN TIME: NORMAL

## 2024-03-04 PROCEDURE — 99211 OFF/OP EST MAY X REQ PHY/QHP: CPT | Performed by: INTERNAL MEDICINE

## 2024-03-04 PROCEDURE — 85610 PROTHROMBIN TIME: CPT | Mod: QW

## 2024-03-05 DIAGNOSIS — I50.22 CHRONIC SYSTOLIC HEART FAILURE (MULTI): ICD-10-CM

## 2024-03-05 DIAGNOSIS — I48.20 CHRONIC ATRIAL FIBRILLATION (MULTI): ICD-10-CM

## 2024-03-05 DIAGNOSIS — I20.9 ANGINA PECTORIS (CMS-HCC): ICD-10-CM

## 2024-03-05 DIAGNOSIS — Z95.810 IMPLANTABLE CARDIOVERTER-DEFIBRILLATOR (ICD) IN SITU: ICD-10-CM

## 2024-03-05 RX ORDER — CARVEDILOL 25 MG/1
25 TABLET ORAL 2 TIMES DAILY
Qty: 180 TABLET | Refills: 3 | Status: SHIPPED | OUTPATIENT
Start: 2024-03-05 | End: 2025-02-28

## 2024-03-05 NOTE — TELEPHONE ENCOUNTER
Pt's spouse states that the pt in in need of a refill, they are requesting       Requested Prescriptions     Pending Prescriptions Disp Refills    carvedilol (Coreg) 25 mg tablet 180 tablet 3     Sig: Take 1 tablet (25 mg) by mouth 2 times a day.

## 2024-03-18 ENCOUNTER — ANTICOAGULATION - WARFARIN VISIT (OUTPATIENT)
Dept: CARDIOLOGY | Facility: CLINIC | Age: 79
End: 2024-03-18
Payer: MEDICARE

## 2024-03-18 DIAGNOSIS — I48.91 ATRIAL FIBRILLATION, UNSPECIFIED TYPE (MULTI): ICD-10-CM

## 2024-03-18 DIAGNOSIS — I48.20 CHRONIC ATRIAL FIBRILLATION (MULTI): ICD-10-CM

## 2024-03-18 DIAGNOSIS — Z79.01 LONG TERM (CURRENT) USE OF ANTICOAGULANTS: ICD-10-CM

## 2024-03-18 DIAGNOSIS — N18.30 CHRONIC KIDNEY DISEASE, STAGE 3 UNSPECIFIED (MULTI): Primary | ICD-10-CM

## 2024-03-18 LAB
POC INR: 3.2
POC PROTHROMBIN TIME: NORMAL

## 2024-03-18 PROCEDURE — 85610 PROTHROMBIN TIME: CPT | Mod: QW

## 2024-03-18 PROCEDURE — 99211 OFF/OP EST MAY X REQ PHY/QHP: CPT | Performed by: INTERNAL MEDICINE

## 2024-03-18 NOTE — PROGRESS NOTES
Patient identification verified with 2 identifiers.    Location: [facility] - [address] [place] [address] [phone]     Referring Physician: DR STAUFFER  Enrollment/ Re-enrollment date: 10/24   INR Goal: 2.0-3.0  INR monitoring is per [facility] protocol.  Anticoagulation Medication: warfarin  Indication: Atrial Fibrillation/Atrial Flutter    Subjective   Bleeding signs/symptoms: No    Bruising: No   Major bleeding event: No  Thrombosis signs/symptoms: No  Thromboembolic event: No  Missed doses: No  Extra doses: No  Medication changes: No  Dietary changes: No  Change in health: No  Change in activity: No  Alcohol: No  Other concerns: No    Upcoming Surgeries:  Does the Patient Have any upcoming surgeries that require interruption in anticoagulation therapy? no  Does the patient require bridging? no      Anticoagulation Summary  As of 3/18/2024      INR goal:  2.0-3.0   TTR:  64.5 % (5.9 mo)   INR used for dosing:  3.20 (3/18/2024)   Weekly warfarin total:  39 mg             Assessment/Plan   Therapeutic     1. New dose: decrease dose   2. Next INR: 1week      Education provided to patient during the visit:  Patient instructed to call in interim with questions, concerns and changes.   Patient educated on interactions between medications and warfarin.   Patient educated on dietary consistency in vitamin k consumption.   Patient educated on affects of alcohol consumption while taking warfarin.   Patient educated on signs of bleeding/clotting.   Patient educated on compliance with dosing, follow up appointments, and prescribed plan of care.

## 2024-03-25 ENCOUNTER — ANTICOAGULATION - WARFARIN VISIT (OUTPATIENT)
Dept: CARDIOLOGY | Facility: CLINIC | Age: 79
End: 2024-03-25
Payer: MEDICARE

## 2024-03-25 ENCOUNTER — LAB (OUTPATIENT)
Dept: LAB | Facility: LAB | Age: 79
End: 2024-03-25
Payer: MEDICARE

## 2024-03-25 DIAGNOSIS — I48.20 CHRONIC ATRIAL FIBRILLATION (MULTI): ICD-10-CM

## 2024-03-25 DIAGNOSIS — N18.30 CHRONIC KIDNEY DISEASE, STAGE 3 UNSPECIFIED (MULTI): ICD-10-CM

## 2024-03-25 DIAGNOSIS — I48.91 ATRIAL FIBRILLATION, UNSPECIFIED TYPE (MULTI): ICD-10-CM

## 2024-03-25 DIAGNOSIS — Z79.01 LONG TERM (CURRENT) USE OF ANTICOAGULANTS: ICD-10-CM

## 2024-03-25 DIAGNOSIS — N18.30 CHRONIC KIDNEY DISEASE, STAGE 3 UNSPECIFIED (MULTI): Primary | ICD-10-CM

## 2024-03-25 LAB
ALBUMIN SERPL BCP-MCNC: 4.1 G/DL (ref 3.4–5)
ANION GAP SERPL CALC-SCNC: 14 MMOL/L (ref 10–20)
BUN SERPL-MCNC: 61 MG/DL (ref 6–23)
CALCIUM SERPL-MCNC: 9 MG/DL (ref 8.6–10.3)
CHLORIDE SERPL-SCNC: 102 MMOL/L (ref 98–107)
CO2 SERPL-SCNC: 29 MMOL/L (ref 21–32)
CREAT SERPL-MCNC: 2.67 MG/DL (ref 0.5–1.3)
EGFRCR SERPLBLD CKD-EPI 2021: 24 ML/MIN/1.73M*2
ERYTHROCYTE [DISTWIDTH] IN BLOOD BY AUTOMATED COUNT: 13.8 % (ref 11.5–14.5)
GLUCOSE SERPL-MCNC: 213 MG/DL (ref 74–99)
HCT VFR BLD AUTO: 41.5 % (ref 41–52)
HGB BLD-MCNC: 12.9 G/DL (ref 13.5–17.5)
MCH RBC QN AUTO: 28.1 PG (ref 26–34)
MCHC RBC AUTO-ENTMCNC: 31.1 G/DL (ref 32–36)
MCV RBC AUTO: 90 FL (ref 80–100)
NRBC BLD-RTO: 0 /100 WBCS (ref 0–0)
PHOSPHATE SERPL-MCNC: 4.4 MG/DL (ref 2.5–4.9)
PLATELET # BLD AUTO: 235 X10*3/UL (ref 150–450)
POC INR: 3.4
POC PROTHROMBIN TIME: NORMAL
POTASSIUM SERPL-SCNC: 4.2 MMOL/L (ref 3.5–5.3)
RBC # BLD AUTO: 4.59 X10*6/UL (ref 4.5–5.9)
SODIUM SERPL-SCNC: 141 MMOL/L (ref 136–145)
WBC # BLD AUTO: 11 X10*3/UL (ref 4.4–11.3)

## 2024-03-25 PROCEDURE — 85610 PROTHROMBIN TIME: CPT | Mod: QW

## 2024-03-25 PROCEDURE — 80069 RENAL FUNCTION PANEL: CPT

## 2024-03-25 PROCEDURE — 83970 ASSAY OF PARATHORMONE: CPT

## 2024-03-25 PROCEDURE — 36415 COLL VENOUS BLD VENIPUNCTURE: CPT

## 2024-03-25 PROCEDURE — 99211 OFF/OP EST MAY X REQ PHY/QHP: CPT | Performed by: INTERNAL MEDICINE

## 2024-03-25 PROCEDURE — 85027 COMPLETE CBC AUTOMATED: CPT

## 2024-03-25 NOTE — PROGRESS NOTES
Patient identification verified with 2 identifiers.    Location: [facility] - [address] [place] [address] [phone]     Referring Physician: DR STAUFFER  Enrollment/ Re-enrollment date: 10/24   INR Goal: 2.0-3.0  INR monitoring is per [facility] protocol.  Anticoagulation Medication: warfarin  Indication: Atrial Fibrillation/Atrial Flutter    Subjective   Bleeding signs/symptoms: No    Bruising: No   Major bleeding event: No  Thrombosis signs/symptoms: No  Thromboembolic event: No  Missed doses: No  Extra doses: No  Medication changes: No  Dietary changes: No  Change in health: No  Change in activity: No  Alcohol: No  Other concerns: No    Upcoming Surgeries:  Does the Patient Have any upcoming surgeries that require interruption in anticoagulation therapy? no  Does the patient require bridging? no      Anticoagulation Summary  As of 3/25/2024      INR goal:  2.0-3.0   TTR:  62.1 % (6.1 mo)   INR used for dosing:  3.40 (3/25/2024)   Weekly warfarin total:  39 mg             Assessment/Plan   Therapeutic     1. New dose: decrease dose   2. Next INR: 1 week, will be 2 weeks due to appointments and schedule.      Education provided to patient during the visit:  Patient instructed to call in interim with questions, concerns and changes.   Patient educated on interactions between medications and warfarin.   Patient educated on dietary consistency in vitamin k consumption.   Patient educated on affects of alcohol consumption while taking warfarin.   Patient educated on signs of bleeding/clotting.   Patient educated on compliance with dosing, follow up appointments, and prescribed plan of care.

## 2024-03-26 LAB — PTH-INTACT SERPL-MCNC: 319.8 PG/ML (ref 18.5–88)

## 2024-04-01 ENCOUNTER — APPOINTMENT (OUTPATIENT)
Dept: CARDIOLOGY | Facility: CLINIC | Age: 79
End: 2024-04-01
Payer: MEDICARE

## 2024-04-08 ENCOUNTER — ANTICOAGULATION - WARFARIN VISIT (OUTPATIENT)
Dept: CARDIOLOGY | Facility: CLINIC | Age: 79
End: 2024-04-08
Payer: MEDICARE

## 2024-04-08 DIAGNOSIS — I48.91 ATRIAL FIBRILLATION, UNSPECIFIED TYPE (MULTI): ICD-10-CM

## 2024-04-08 DIAGNOSIS — I48.20 CHRONIC ATRIAL FIBRILLATION (MULTI): ICD-10-CM

## 2024-04-08 DIAGNOSIS — Z79.01 LONG TERM (CURRENT) USE OF ANTICOAGULANTS: ICD-10-CM

## 2024-04-08 LAB
POC INR: 2.7
POC PROTHROMBIN TIME: NORMAL

## 2024-04-08 PROCEDURE — 99211 OFF/OP EST MAY X REQ PHY/QHP: CPT | Performed by: INTERNAL MEDICINE

## 2024-04-08 PROCEDURE — 85610 PROTHROMBIN TIME: CPT | Mod: QW

## 2024-04-08 NOTE — PROGRESS NOTES
Patient identification verified with 2 identifiers.    Location: [facility] - [address] [place] [address] [phone]     Referring Physician: DR STAUFFER  Enrollment/ Re-enrollment date: 10/24   INR Goal: 2.0-3.0  INR monitoring is per [facility] protocol.  Anticoagulation Medication: warfarin  Indication: Atrial Fibrillation/Atrial Flutter    Subjective   Bleeding signs/symptoms: No    Bruising: No   Major bleeding event: No  Thrombosis signs/symptoms: No  Thromboembolic event: No  Missed doses: No  Extra doses: No  Medication changes: No  Dietary changes: No  Change in health: No  Change in activity: No  Alcohol: No  Other concerns: No    Upcoming Surgeries:  Does the Patient Have any upcoming surgeries that require interruption in anticoagulation therapy? no  Does the patient require bridging? no      Anticoagulation Summary  As of 2024      INR goal:  2.0-3.0   TTR:  60.7 % (6.6 mo)   INR used for dosin.70 (2024)   Weekly warfarin total:  39 mg               Assessment/Plan   Therapeutic     1. New dose: no change    2. Next INR: 2 weeks      Education provided to patient during the visit:  Patient instructed to call in interim with questions, concerns and changes.   Patient educated on interactions between medications and warfarin.   Patient educated on dietary consistency in vitamin k consumption.   Patient educated on affects of alcohol consumption while taking warfarin.   Patient educated on signs of bleeding/clotting.   Patient educated on compliance with dosing, follow up appointments, and prescribed plan of care.

## 2024-04-22 ENCOUNTER — ANTICOAGULATION - WARFARIN VISIT (OUTPATIENT)
Dept: CARDIOLOGY | Facility: CLINIC | Age: 79
End: 2024-04-22
Payer: MEDICARE

## 2024-04-22 DIAGNOSIS — I48.20 CHRONIC ATRIAL FIBRILLATION (MULTI): ICD-10-CM

## 2024-04-22 DIAGNOSIS — Z79.01 LONG TERM (CURRENT) USE OF ANTICOAGULANTS: ICD-10-CM

## 2024-04-22 DIAGNOSIS — I48.91 ATRIAL FIBRILLATION, UNSPECIFIED TYPE (MULTI): ICD-10-CM

## 2024-04-22 LAB
POC INR: 2.4
POC PROTHROMBIN TIME: NORMAL

## 2024-04-22 PROCEDURE — 85610 PROTHROMBIN TIME: CPT | Mod: QW

## 2024-04-22 PROCEDURE — 99211 OFF/OP EST MAY X REQ PHY/QHP: CPT | Performed by: INTERNAL MEDICINE

## 2024-04-24 DIAGNOSIS — I48.20 CHRONIC ATRIAL FIBRILLATION (MULTI): ICD-10-CM

## 2024-04-24 DIAGNOSIS — N18.32 STAGE 3B CHRONIC KIDNEY DISEASE (MULTI): ICD-10-CM

## 2024-04-24 DIAGNOSIS — I50.20 SYSTOLIC CONGESTIVE HEART FAILURE, UNSPECIFIED HF CHRONICITY (MULTI): ICD-10-CM

## 2024-04-25 RX ORDER — SACUBITRIL AND VALSARTAN 49; 51 MG/1; MG/1
1 TABLET, FILM COATED ORAL 2 TIMES DAILY
Qty: 60 TABLET | Refills: 11 | Status: SHIPPED | OUTPATIENT
Start: 2024-04-25

## 2024-04-25 NOTE — TELEPHONE ENCOUNTER
Patients pharmacy is requesting refill of the following medication(s) for a 30 day supply with refills.   Please send the attached prescription(s) as soon as possible.   Thank you.    Requested Prescriptions     Pending Prescriptions Disp Refills    Entresto 49-51 mg tablet [Pharmacy Med Name: Entresto Oral Tablet 49-51 MG] 60 tablet 0     Sig: TAKE ONE TABLET BY MOUTH TWO TIMES A DAY

## 2024-04-29 ENCOUNTER — HOSPITAL ENCOUNTER (OUTPATIENT)
Dept: CARDIOLOGY | Facility: CLINIC | Age: 79
Discharge: HOME | End: 2024-04-29
Payer: MEDICARE

## 2024-04-29 DIAGNOSIS — Z95.810 IMPLANTABLE CARDIOVERTER-DEFIBRILLATOR (ICD) IN SITU: ICD-10-CM

## 2024-04-29 DIAGNOSIS — I25.5 ISCHEMIC CARDIOMYOPATHY: ICD-10-CM

## 2024-04-29 DIAGNOSIS — I48.0 PAROXYSMAL ATRIAL FIBRILLATION (MULTI): ICD-10-CM

## 2024-04-29 PROCEDURE — 93296 REM INTERROG EVL PM/IDS: CPT

## 2024-04-29 PROCEDURE — 93295 DEV INTERROG REMOTE 1/2/MLT: CPT | Performed by: INTERNAL MEDICINE

## 2024-05-14 ENCOUNTER — APPOINTMENT (OUTPATIENT)
Dept: CARDIOLOGY | Facility: CLINIC | Age: 79
End: 2024-05-14
Payer: MEDICARE

## 2024-05-20 ENCOUNTER — ANTICOAGULATION - WARFARIN VISIT (OUTPATIENT)
Dept: CARDIOLOGY | Facility: CLINIC | Age: 79
End: 2024-05-20
Payer: MEDICARE

## 2024-05-20 DIAGNOSIS — Z79.01 LONG TERM (CURRENT) USE OF ANTICOAGULANTS: ICD-10-CM

## 2024-05-20 DIAGNOSIS — I48.20 CHRONIC ATRIAL FIBRILLATION (MULTI): ICD-10-CM

## 2024-05-20 DIAGNOSIS — I48.91 ATRIAL FIBRILLATION, UNSPECIFIED TYPE (MULTI): ICD-10-CM

## 2024-05-20 LAB
POC INR: 2.3
POC PROTHROMBIN TIME: NORMAL

## 2024-05-20 PROCEDURE — 99211 OFF/OP EST MAY X REQ PHY/QHP: CPT | Performed by: INTERNAL MEDICINE

## 2024-05-20 PROCEDURE — 85610 PROTHROMBIN TIME: CPT | Mod: QW

## 2024-05-20 NOTE — PROGRESS NOTES
Patient identification verified with 2 identifiers.    Location: [facility] - [address] [place] [address] [phone]     Referring Physician: DR STAUFFER  Enrollment/ Re-enrollment date: 10/24   INR Goal: 2.0-3.0  INR monitoring is per [facility] protocol.  Anticoagulation Medication: warfarin  Indication: Atrial Fibrillation/Atrial Flutter    Subjective   Bleeding signs/symptoms: No    Bruising: No   Major bleeding event: No  Thrombosis signs/symptoms: No  Thromboembolic event: No  Missed doses: No  Extra doses: No  Medication changes: No  Dietary changes: No  Change in health: No  Change in activity: No  Alcohol: No  Other concerns: No    Upcoming Surgeries:  Does the Patient Have any upcoming surgeries that require interruption in anticoagulation therapy? no  Does the patient require bridging? no      Anticoagulation Summary  As of 2024      INR goal:  2.0-3.0   TTR:  67.6% (8 mo)   INR used for dosin.30 (2024)   Weekly warfarin total:  39 mg             Assessment/Plan   Therapeutic     1. New dose: no change    2. Next INR: 1 month      Education provided to patient during the visit:  Patient instructed to call in interim with questions, concerns and changes.   Patient educated on interactions between medications and warfarin.   Patient educated on dietary consistency in vitamin k consumption.   Patient educated on affects of alcohol consumption while taking warfarin.   Patient educated on signs of bleeding/clotting.   Patient educated on compliance with dosing, follow up appointments, and prescribed plan of care.

## 2024-05-23 ENCOUNTER — HOSPITAL ENCOUNTER (OUTPATIENT)
Dept: CARDIOLOGY | Facility: CLINIC | Age: 79
Discharge: HOME | End: 2024-05-23
Payer: MEDICARE

## 2024-05-23 DIAGNOSIS — Z95.810 PRESENCE OF AUTOMATIC CARDIOVERTER/DEFIBRILLATOR (AICD): Primary | ICD-10-CM

## 2024-05-23 DIAGNOSIS — I48.0 PAF (PAROXYSMAL ATRIAL FIBRILLATION) (MULTI): ICD-10-CM

## 2024-05-23 DIAGNOSIS — Z95.810 PRESENCE OF AUTOMATIC CARDIOVERTER/DEFIBRILLATOR (AICD): ICD-10-CM

## 2024-05-23 DIAGNOSIS — I25.5 ISCHEMIC CARDIOMYOPATHY: ICD-10-CM

## 2024-05-23 PROCEDURE — 93283 PRGRMG EVAL IMPLANTABLE DFB: CPT

## 2024-05-23 PROCEDURE — 93283 PRGRMG EVAL IMPLANTABLE DFB: CPT | Performed by: INTERNAL MEDICINE

## 2024-05-31 RX ORDER — PREDNISONE 10 MG/1
TABLET ORAL
COMMUNITY
Start: 2024-03-20

## 2024-05-31 RX ORDER — HYDROCHLOROTHIAZIDE 25 MG/1
TABLET ORAL EVERY 24 HOURS
COMMUNITY
End: 2024-06-10 | Stop reason: WASHOUT

## 2024-06-10 ENCOUNTER — OFFICE VISIT (OUTPATIENT)
Dept: CARDIOLOGY | Facility: CLINIC | Age: 79
End: 2024-06-10
Payer: MEDICARE

## 2024-06-10 VITALS
RESPIRATION RATE: 16 BRPM | HEART RATE: 62 BPM | OXYGEN SATURATION: 98 % | WEIGHT: 198.6 LBS | DIASTOLIC BLOOD PRESSURE: 62 MMHG | HEIGHT: 70 IN | TEMPERATURE: 98.6 F | SYSTOLIC BLOOD PRESSURE: 102 MMHG | BODY MASS INDEX: 28.43 KG/M2

## 2024-06-10 DIAGNOSIS — I51.3 LEFT VENTRICULAR THROMBUS: ICD-10-CM

## 2024-06-10 DIAGNOSIS — Z95.5 PRESENCE OF CORONARY ANGIOPLASTY IMPLANT AND GRAFT: ICD-10-CM

## 2024-06-10 DIAGNOSIS — I50.20 SYSTOLIC CONGESTIVE HEART FAILURE, UNSPECIFIED HF CHRONICITY (MULTI): ICD-10-CM

## 2024-06-10 DIAGNOSIS — I10 PRIMARY HYPERTENSION: ICD-10-CM

## 2024-06-10 DIAGNOSIS — R06.02 SOB (SHORTNESS OF BREATH) ON EXERTION: Primary | ICD-10-CM

## 2024-06-10 DIAGNOSIS — Z95.810 IMPLANTABLE CARDIOVERTER-DEFIBRILLATOR (ICD) IN SITU: ICD-10-CM

## 2024-06-10 PROCEDURE — 3074F SYST BP LT 130 MM HG: CPT | Performed by: INTERNAL MEDICINE

## 2024-06-10 PROCEDURE — 99214 OFFICE O/P EST MOD 30 MIN: CPT | Performed by: INTERNAL MEDICINE

## 2024-06-10 PROCEDURE — 3078F DIAST BP <80 MM HG: CPT | Performed by: INTERNAL MEDICINE

## 2024-06-10 PROCEDURE — 1159F MED LIST DOCD IN RCRD: CPT | Performed by: INTERNAL MEDICINE

## 2024-06-10 PROCEDURE — 1126F AMNT PAIN NOTED NONE PRSNT: CPT | Performed by: INTERNAL MEDICINE

## 2024-06-10 PROCEDURE — 1160F RVW MEDS BY RX/DR IN RCRD: CPT | Performed by: INTERNAL MEDICINE

## 2024-06-10 ASSESSMENT — LIFESTYLE VARIABLES
HOW OFTEN DO YOU HAVE A DRINK CONTAINING ALCOHOL: NEVER
HAS A RELATIVE, FRIEND, DOCTOR, OR ANOTHER HEALTH PROFESSIONAL EXPRESSED CONCERN ABOUT YOUR DRINKING OR SUGGESTED YOU CUT DOWN: NO
HAVE YOU OR SOMEONE ELSE BEEN INJURED AS A RESULT OF YOUR DRINKING: NO
AUDIT TOTAL SCORE: 0
SKIP TO QUESTIONS 9-10: 1
HOW OFTEN DO YOU HAVE SIX OR MORE DRINKS ON ONE OCCASION: NEVER
AUDIT-C TOTAL SCORE: 0
HOW MANY STANDARD DRINKS CONTAINING ALCOHOL DO YOU HAVE ON A TYPICAL DAY: PATIENT DOES NOT DRINK

## 2024-06-10 ASSESSMENT — ENCOUNTER SYMPTOMS
OCCASIONAL FEELINGS OF UNSTEADINESS: 0
LOSS OF SENSATION IN FEET: 0
DEPRESSION: 0

## 2024-06-10 ASSESSMENT — PAIN SCALES - GENERAL: PAINLEVEL: 0-NO PAIN

## 2024-06-10 ASSESSMENT — PATIENT HEALTH QUESTIONNAIRE - PHQ9
SUM OF ALL RESPONSES TO PHQ9 QUESTIONS 1 AND 2: 0
1. LITTLE INTEREST OR PLEASURE IN DOING THINGS: NOT AT ALL
2. FEELING DOWN, DEPRESSED OR HOPELESS: NOT AT ALL

## 2024-06-10 NOTE — PROGRESS NOTES
History of present illness:  This is a very pleasant 79-year-old male with history of ischemic cardiomyopathy ejection fraction of 20-25%, status post ICD, NYHA class II-III. patient returns to my office for follow-up.  Has been feeling much better since has been on the Farxiga and has been taking it every other day.  Lower extremity edema has improved.  Denies having chest pain.  Has been feeling dizzy sometimes.   Past Medical History:   Diagnosis Date    AICD (automatic cardioverter/defibrillator) present 05/29/2016    Formatting of this note might be different from the original. Patient reports that this was placed and he does have history of atrial fibrillation.    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 - L sided subcortical hemorrhage. ICH score of 0. - Etiology is hypertensive and coagulopathy. - Repeat CT scan in 6 hours for stability.    Cancer (Multi)     Chronic atrial fibrillation (Multi) 09/06/2023    Chronic systolic heart failure (Multi) 09/06/2023    CKD (chronic kidney disease) 08/30/2021    Coronary artery disease 05/29/2016    Diabetes mellitus (Multi) 07/18/2018    Essential hypertension 03/24/2023    History of coronary artery stent placement 09/06/2023    Hypertension     Left ventricular thrombus 06/01/2016    Formatting of this note might be different from the original. 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 His smoking use included cigarettes. He has been exposed to tobacco smoke. He has never used smokeless tobacco. He reports that he does not currently use alcohol. He reports that he does not use drugs.    Family History   Problem Relation Name Age of Onset    Other (accident) Father         Patient's Medications   New Prescriptions    No medications on file   Previous Medications    ATORVASTATIN (LIPITOR) 80 MG TABLET    Take 1 tablet (80 mg) by mouth once daily at bedtime.    BLOOD SUGAR DIAGNOSTIC (TRUETRACK TEST) STRIP    2 times a day.    CARVEDILOL (COREG) 25 MG TABLET    Take 1 tablet (25 mg) by mouth 2 times a day.    CHOLECALCIFEROL (VITAMIN D3) 50 MCG (2000 UT) TABLET        DAPAGLIFLOZIN PROPANEDIOL (FARXIGA) 10 MG    Take 1 tablet (10 mg) by mouth once daily.    ENTRESTO 49-51 MG TABLET    TAKE ONE TABLET BY MOUTH TWO TIMES A DAY    HYDROCHLOROTHIAZIDE (HYDRODIURIL) 25 MG TABLET    Take by mouth once every 24 hours.    LAMOTRIGINE (LAMICTAL) 200 MG TABLET    Take 1 tablet (200 mg) by mouth once daily in the morning.    PREDNISONE (DELTASONE) 10 MG TABLET        TORSEMIDE (DEMADEX) 20 MG TABLET    Take 1 tablet (20 mg) by mouth 2 times a day.    WARFARIN (JANTOVEN) 6 MG TABLET    Take 1 tablet (6 mg) by mouth once daily in the evening.   Modified Medications    No medications on file   Discontinued Medications    No medications on file       Objective   Physical Exam  General: Patient in no acute distress   HEENT: Atraumatic normocephalic.  Neck: Supple, jugular venous pressure within normal limit.  No bruits  Lungs: Clear to auscultation bilaterally  Cardiovascular: Regular rate and rhythm, normal heart sounds, no murmurs rubs or gallops  Abdomen: Soft nontender nondistended.  Normal bowel sounds.  Extremities: Warm to touch, no edema.      Lab Review   Anticoagulation - Warfarin Visit on 05/20/2024   Component Date Value    POC INR 05/20/2024 2.30    Anticoagulation - Warfarin Visit on  04/22/2024   Component Date Value    POC INR 04/22/2024 2.40         Assessment/Plan   Patient Active Problem List   Diagnosis    Primary hypertension    Chronic atrial fibrillation (Multi)    Bipolar disorder (Multi)    Cardiomyopathy (Multi)    Cerebrovascular accident (Multi)    Chronic CHF (Multi)    Chronic systolic heart failure (Multi)    Chronic kidney disease, stage 3 unspecified (Multi)    Constipation    Coronary artery disease    Diabetes mellitus (Multi)    Hypercholesterolemia    History of coronary artery stent placement    Implantable cardioverter-defibrillator (ICD) in situ    Generalized ischemic myocardial dysfunction    Angina pectoris (CMS-HCC)    Umbilical hernia    Long term (current) use of anticoagulants    Atrial fibrillation (Multi)    Basal ganglia hemorrhage (Multi)    Dysarthria    Left ventricular thrombus    Acute renal failure (CMS-HCC)    Presence of coronary angioplasty implant and graft    Personal history of nicotine dependence    Other specified disorders of kidney and ureter    Neoplasm of uncertain behavior of left kidney    Hypertensive heart and chronic kidney disease with heart failure and stage 1 through stage 4 chronic kidney disease, or unspecified chronic kidney disease (Multi)    Contusion of finger of right hand, initial encounter    Benign neoplasm of left kidney      This is a very pleasant 79-year-old male with history of ischemic cardiomyopathy ejection fraction of 20-25%, status post ICD, NYHA class II-III. patient returns to my office for follow-up.  Has been feeling much better since has been on the Farxiga and has been taking it every other day.  Lower extremity edema has improved.  Denies having chest pain.  Has been feeling dizzy sometimes.  Blood pressure running on the low normal.  Reviewed his last kidney function creatinine is 2.6.  Has worsened slightly from baseline.  At this point my recommendation is to decrease torsemide to 20 mg oral daily.   Continue other medications.  Remains symptomatic NYHA class III but I do not expect much improvement at this point with longstanding history of heart failure on optimal medical therapy.  Will follow-up in 3 months.      Rosamaria Seymour MD

## 2024-06-10 NOTE — PATIENT INSTRUCTIONS
Denny Calhoun is doing very good.  Is stable from my standpoint.  You may decrease his torsemide to half a dose according to my records he is taking 20 mg twice a day with a decrease it to once a day.  Otherwise is doing good I will see him in 4 months.

## 2024-06-17 ENCOUNTER — ANTICOAGULATION - WARFARIN VISIT (OUTPATIENT)
Dept: CARDIOLOGY | Facility: CLINIC | Age: 79
End: 2024-06-17
Payer: MEDICARE

## 2024-06-17 DIAGNOSIS — I48.20 CHRONIC ATRIAL FIBRILLATION (MULTI): ICD-10-CM

## 2024-06-17 DIAGNOSIS — Z79.01 LONG TERM (CURRENT) USE OF ANTICOAGULANTS: ICD-10-CM

## 2024-06-17 DIAGNOSIS — I48.91 ATRIAL FIBRILLATION, UNSPECIFIED TYPE (MULTI): ICD-10-CM

## 2024-06-17 LAB
POC INR: 3.5
POC PROTHROMBIN TIME: NORMAL

## 2024-06-17 PROCEDURE — 85610 PROTHROMBIN TIME: CPT | Mod: QW

## 2024-06-17 PROCEDURE — 99211 OFF/OP EST MAY X REQ PHY/QHP: CPT | Performed by: INTERNAL MEDICINE

## 2024-06-17 NOTE — PROGRESS NOTES
Patient identification verified with 2 identifiers.    Location: [facility] - [address] [place] [address] [phone]     Referring Physician: DR STAUFFER  Enrollment/ Re-enrollment date: 10/24   INR Goal: 2.0-3.0  INR monitoring is per [facility] protocol.  Anticoagulation Medication: warfarin  Indication: Atrial Fibrillation/Atrial Flutter    Subjective   Bleeding signs/symptoms: No    Bruising: No   Major bleeding event: No  Thrombosis signs/symptoms: No  Thromboembolic event: No  Missed doses: No  Extra doses: No  Medication changes: Yes  Dietary changes: Yes  decrease of torsemide  Change in health: No  Change in activity: No  Alcohol: No  Other concerns: No    Upcoming Surgeries:  Does the Patient Have any upcoming surgeries that require interruption in anticoagulation therapy? no  Does the patient require bridging? no      Anticoagulation Summary  As of 6/17/2024      INR goal:  2.0-3.0   TTR:  66.6% (8.9 mo)   INR used for dosing:  3.50 (6/17/2024)   Weekly warfarin total:  39 mg             Assessment/Plan   Supratherapeutic     1. New dose:  hold today     2. Next INR: 1 week      Education provided to patient during the visit:  Patient instructed to call in interim with questions, concerns and changes.   Patient educated on interactions between medications and warfarin.   Patient educated on dietary consistency in vitamin k consumption.   Patient educated on affects of alcohol consumption while taking warfarin.   Patient educated on signs of bleeding/clotting.   Patient educated on compliance with dosing, follow up appointments, and prescribed plan of care.

## 2024-06-22 DIAGNOSIS — E11.9 TYPE 2 DIABETES MELLITUS WITHOUT COMPLICATION, WITHOUT LONG-TERM CURRENT USE OF INSULIN (MULTI): ICD-10-CM

## 2024-06-24 ENCOUNTER — ANTICOAGULATION - WARFARIN VISIT (OUTPATIENT)
Dept: CARDIOLOGY | Facility: CLINIC | Age: 79
End: 2024-06-24
Payer: MEDICARE

## 2024-06-24 DIAGNOSIS — I48.91 ATRIAL FIBRILLATION, UNSPECIFIED TYPE (MULTI): ICD-10-CM

## 2024-06-24 DIAGNOSIS — Z79.01 LONG TERM (CURRENT) USE OF ANTICOAGULANTS: ICD-10-CM

## 2024-06-24 DIAGNOSIS — I48.20 CHRONIC ATRIAL FIBRILLATION (MULTI): ICD-10-CM

## 2024-06-24 LAB
POC INR: 2.9
POC PROTHROMBIN TIME: NORMAL

## 2024-06-24 PROCEDURE — 85610 PROTHROMBIN TIME: CPT | Mod: QW

## 2024-06-24 PROCEDURE — 99211 OFF/OP EST MAY X REQ PHY/QHP: CPT | Performed by: INTERNAL MEDICINE

## 2024-06-24 RX ORDER — DAPAGLIFLOZIN 10 MG/1
10 TABLET, FILM COATED ORAL DAILY
Qty: 30 TABLET | Refills: 0 | Status: SHIPPED | OUTPATIENT
Start: 2024-06-24

## 2024-06-24 NOTE — PROGRESS NOTES
Patient identification verified with 2 identifiers.    Location: [facility] - [address] [place] [address] [phone]     Referring Physician: DR STAUFFER  Enrollment/ Re-enrollment date: 10/24   INR Goal: 2.0-3.0  INR monitoring is per [facility] protocol.  Anticoagulation Medication: warfarin  Indication: Atrial Fibrillation/Atrial Flutter    Subjective   Bleeding signs/symptoms: No    Bruising: No   Major bleeding event: No  Thrombosis signs/symptoms: No  Thromboembolic event: No  Missed doses: No  Extra doses: No  Medication changes: No  Dietary changes: No  Change in health: No  Change in activity: No  Alcohol: No  Other concerns: No    Upcoming Surgeries:  Does the Patient Have any upcoming surgeries that require interruption in anticoagulation therapy? no  Does the patient require bridging? no      Anticoagulation Summary  As of 2024      INR goal:  2.0-3.0   TTR:  65.4% (9.1 mo)   INR used for dosin.90 (2024)   Weekly warfarin total:  39 mg             Assessment/Plan   Therapeutic     1. New dose: no change    2. Next INR: 1 week      Education provided to patient during the visit:  Patient instructed to call in interim with questions, concerns and changes.   Patient educated on interactions between medications and warfarin.   Patient educated on dietary consistency in vitamin k consumption.   Patient educated on affects of alcohol consumption while taking warfarin.   Patient educated on signs of bleeding/clotting.   Patient educated on compliance with dosing, follow up appointments, and prescribed plan of care.

## 2024-07-01 ENCOUNTER — ANTICOAGULATION - WARFARIN VISIT (OUTPATIENT)
Dept: CARDIOLOGY | Facility: CLINIC | Age: 79
End: 2024-07-01
Payer: MEDICARE

## 2024-07-01 DIAGNOSIS — I48.20 CHRONIC ATRIAL FIBRILLATION (MULTI): ICD-10-CM

## 2024-07-01 DIAGNOSIS — I48.91 ATRIAL FIBRILLATION, UNSPECIFIED TYPE (MULTI): ICD-10-CM

## 2024-07-01 DIAGNOSIS — Z79.01 LONG TERM (CURRENT) USE OF ANTICOAGULANTS: ICD-10-CM

## 2024-07-01 LAB
POC INR: 3
POC PROTHROMBIN TIME: NORMAL

## 2024-07-01 PROCEDURE — 85610 PROTHROMBIN TIME: CPT | Mod: QW

## 2024-07-01 PROCEDURE — 99211 OFF/OP EST MAY X REQ PHY/QHP: CPT | Performed by: INTERNAL MEDICINE

## 2024-07-01 NOTE — PROGRESS NOTES
Patient identification verified with 2 identifiers.    Location: [facility] - [address] [place] [address] [phone]     Referring Physician: DR STAUFFER  Enrollment/ Re-enrollment date: 10/24   INR Goal: 2.0-3.0  INR monitoring is per [facility] protocol.  Anticoagulation Medication: warfarin  Indication: Atrial Fibrillation/Atrial Flutter    Subjective   Bleeding signs/symptoms: No    Bruising: No   Major bleeding event: No  Thrombosis signs/symptoms: No  Thromboembolic event: No  Missed doses: No  Extra doses: No  Medication changes: No  Dietary changes: No  Change in health: No  Change in activity: No  Alcohol: No  Other concerns: No    Upcoming Surgeries:  Does the Patient Have any upcoming surgeries that require interruption in anticoagulation therapy? no  Does the patient require bridging? no      Anticoagulation Summary  As of 7/1/2024      INR goal:  2.0-3.0   TTR:  66.2% (9.4 mo)   INR used for dosing:  3.00 (7/1/2024)   Weekly warfarin total:  39 mg             Assessment/Plan   Therapeutic     1. New dose: no change    2. Next INR: 2 weeks      Education provided to patient during the visit:  Patient instructed to call in interim with questions, concerns and changes.   Patient educated on interactions between medications and warfarin.   Patient educated on dietary consistency in vitamin k consumption.   Patient educated on affects of alcohol consumption while taking warfarin.   Patient educated on signs of bleeding/clotting.   Patient educated on compliance with dosing, follow up appointments, and prescribed plan of care.

## 2024-07-15 ENCOUNTER — ANTICOAGULATION - WARFARIN VISIT (OUTPATIENT)
Dept: CARDIOLOGY | Facility: CLINIC | Age: 79
End: 2024-07-15
Payer: MEDICARE

## 2024-07-15 DIAGNOSIS — I48.20 CHRONIC ATRIAL FIBRILLATION (MULTI): ICD-10-CM

## 2024-07-15 DIAGNOSIS — Z79.01 LONG TERM (CURRENT) USE OF ANTICOAGULANTS: ICD-10-CM

## 2024-07-15 DIAGNOSIS — I48.91 ATRIAL FIBRILLATION, UNSPECIFIED TYPE (MULTI): ICD-10-CM

## 2024-07-15 LAB
POC INR: 2.8
POC PROTHROMBIN TIME: NORMAL

## 2024-07-15 PROCEDURE — 99211 OFF/OP EST MAY X REQ PHY/QHP: CPT | Performed by: INTERNAL MEDICINE

## 2024-07-15 PROCEDURE — 85610 PROTHROMBIN TIME: CPT | Mod: QW

## 2024-07-15 NOTE — PROGRESS NOTES
Patient identification verified with 2 identifiers.    Location: [facility] - [address] [place] [address] [phone]     Referring Physician: DR STAUFFER  Enrollment/ Re-enrollment date: 10/24   INR Goal: 2.0-3.0  INR monitoring is per [facility] protocol.  Anticoagulation Medication: warfarin  Indication: Atrial Fibrillation/Atrial Flutter    Subjective   Bleeding signs/symptoms: No    Bruising: No   Major bleeding event: No  Thrombosis signs/symptoms: No  Thromboembolic event: No  Missed doses: No  Extra doses: No  Medication changes: No  Dietary changes: No  Change in health: No  Change in activity: No  Alcohol: No  Other concerns: No    Upcoming Surgeries:  Does the Patient Have any upcoming surgeries that require interruption in anticoagulation therapy? no  Does the patient require bridging? no      Anticoagulation Summary  As of 7/15/2024      INR goal:  2.0-3.0   TTR:  67.8% (9.8 mo)   INR used for dosin.80 (7/15/2024)   Weekly warfarin total:  39 mg             Assessment/Plan   Therapeutic     1. New dose: no change    2. Next INR: 2 weeks      Education provided to patient during the visit:  Patient instructed to call in interim with questions, concerns and changes.   Patient educated on interactions between medications and warfarin.   Patient educated on dietary consistency in vitamin k consumption.   Patient educated on affects of alcohol consumption while taking warfarin.   Patient educated on signs of bleeding/clotting.   Patient educated on compliance with dosing, follow up appointments, and prescribed plan of care.

## 2024-07-19 ENCOUNTER — HOSPITAL ENCOUNTER (OUTPATIENT)
Dept: CARDIOLOGY | Facility: CLINIC | Age: 79
Discharge: HOME | End: 2024-07-19
Payer: MEDICARE

## 2024-07-19 DIAGNOSIS — I25.5 ISCHEMIC CARDIOMYOPATHY: ICD-10-CM

## 2024-07-19 DIAGNOSIS — I48.0 PAF (PAROXYSMAL ATRIAL FIBRILLATION) (MULTI): ICD-10-CM

## 2024-07-19 DIAGNOSIS — Z95.810 PRESENCE OF AUTOMATIC CARDIOVERTER/DEFIBRILLATOR (AICD): ICD-10-CM

## 2024-07-23 ENCOUNTER — LAB (OUTPATIENT)
Dept: LAB | Facility: LAB | Age: 79
End: 2024-07-23
Payer: MEDICARE

## 2024-07-23 DIAGNOSIS — I48.20 CHRONIC ATRIAL FIBRILLATION (MULTI): ICD-10-CM

## 2024-07-23 DIAGNOSIS — N18.32 STAGE 3B CHRONIC KIDNEY DISEASE (MULTI): ICD-10-CM

## 2024-07-23 DIAGNOSIS — N18.30 CHRONIC KIDNEY DISEASE, STAGE 3 UNSPECIFIED (MULTI): ICD-10-CM

## 2024-07-23 DIAGNOSIS — E78.00 HYPERCHOLESTEROLEMIA: ICD-10-CM

## 2024-07-23 DIAGNOSIS — I50.20 SYSTOLIC CONGESTIVE HEART FAILURE, UNSPECIFIED HF CHRONICITY (MULTI): ICD-10-CM

## 2024-07-23 DIAGNOSIS — E11.9 TYPE 2 DIABETES MELLITUS WITHOUT COMPLICATION, WITHOUT LONG-TERM CURRENT USE OF INSULIN (MULTI): ICD-10-CM

## 2024-07-23 DIAGNOSIS — Z79.899 MEDICATION MANAGEMENT: ICD-10-CM

## 2024-07-23 DIAGNOSIS — I10 PRIMARY HYPERTENSION: ICD-10-CM

## 2024-07-23 LAB
ALBUMIN SERPL-MCNC: 4.2 G/DL (ref 3.5–5)
ALP BLD-CCNC: 94 U/L (ref 35–125)
ALT SERPL-CCNC: 16 U/L (ref 5–40)
ANION GAP SERPL CALC-SCNC: 14 MMOL/L
APPEARANCE UR: CLEAR
AST SERPL-CCNC: 13 U/L (ref 5–40)
BASOPHILS # BLD AUTO: 0.05 X10*3/UL (ref 0–0.1)
BASOPHILS NFR BLD AUTO: 0.8 %
BILIRUB SERPL-MCNC: 0.7 MG/DL (ref 0.1–1.2)
BILIRUB UR STRIP.AUTO-MCNC: NEGATIVE MG/DL
BUN SERPL-MCNC: 44 MG/DL (ref 8–25)
CALCIUM SERPL-MCNC: 8.9 MG/DL (ref 8.5–10.4)
CHLORIDE SERPL-SCNC: 103 MMOL/L (ref 97–107)
CHOLEST SERPL-MCNC: 89 MG/DL (ref 133–200)
CHOLEST/HDLC SERPL: 3.7 {RATIO}
CO2 SERPL-SCNC: 25 MMOL/L (ref 24–31)
COLOR UR: ABNORMAL
CREAT SERPL-MCNC: 2.6 MG/DL (ref 0.4–1.6)
EGFRCR SERPLBLD CKD-EPI 2021: 24 ML/MIN/1.73M*2
EOSINOPHIL # BLD AUTO: 0.15 X10*3/UL (ref 0–0.4)
EOSINOPHIL NFR BLD AUTO: 2.3 %
ERYTHROCYTE [DISTWIDTH] IN BLOOD BY AUTOMATED COUNT: 14.9 % (ref 11.5–14.5)
EST. AVERAGE GLUCOSE BLD GHB EST-MCNC: 183 MG/DL
GLUCOSE SERPL-MCNC: 173 MG/DL (ref 65–99)
GLUCOSE UR STRIP.AUTO-MCNC: ABNORMAL MG/DL
HBA1C MFR BLD: 8 %
HCT VFR BLD AUTO: 39.5 % (ref 41–52)
HDLC SERPL-MCNC: 24 MG/DL
HGB BLD-MCNC: 12 G/DL (ref 13.5–17.5)
HYALINE CASTS #/AREA URNS AUTO: ABNORMAL /LPF
IMM GRANULOCYTES # BLD AUTO: 0.04 X10*3/UL (ref 0–0.5)
IMM GRANULOCYTES NFR BLD AUTO: 0.6 % (ref 0–0.9)
KETONES UR STRIP.AUTO-MCNC: NEGATIVE MG/DL
LDLC SERPL CALC-MCNC: 35 MG/DL (ref 65–130)
LEUKOCYTE ESTERASE UR QL STRIP.AUTO: ABNORMAL
LYMPHOCYTES # BLD AUTO: 0.55 X10*3/UL (ref 0.8–3)
LYMPHOCYTES NFR BLD AUTO: 8.5 %
MCH RBC QN AUTO: 28.1 PG (ref 26–34)
MCHC RBC AUTO-ENTMCNC: 30.4 G/DL (ref 32–36)
MCV RBC AUTO: 93 FL (ref 80–100)
MONOCYTES # BLD AUTO: 0.47 X10*3/UL (ref 0.05–0.8)
MONOCYTES NFR BLD AUTO: 7.2 %
MUCOUS THREADS #/AREA URNS AUTO: ABNORMAL /LPF
NEUTROPHILS # BLD AUTO: 5.24 X10*3/UL (ref 1.6–5.5)
NEUTROPHILS NFR BLD AUTO: 80.6 %
NITRITE UR QL STRIP.AUTO: NEGATIVE
NRBC BLD-RTO: 0 /100 WBCS (ref 0–0)
PH UR STRIP.AUTO: 5.5 [PH]
PHOSPHATE SERPL-MCNC: 4.6 MG/DL (ref 2.5–4.5)
PLATELET # BLD AUTO: 210 X10*3/UL (ref 150–450)
POTASSIUM SERPL-SCNC: 4.9 MMOL/L (ref 3.4–5.1)
PROT SERPL-MCNC: 6.1 G/DL (ref 5.9–7.9)
PROT UR STRIP.AUTO-MCNC: ABNORMAL MG/DL
RBC # BLD AUTO: 4.27 X10*6/UL (ref 4.5–5.9)
RBC # UR STRIP.AUTO: NEGATIVE /UL
RBC #/AREA URNS AUTO: ABNORMAL /HPF
SODIUM SERPL-SCNC: 142 MMOL/L (ref 133–145)
SP GR UR STRIP.AUTO: 1.01
TRIGL SERPL-MCNC: 152 MG/DL (ref 40–150)
UROBILINOGEN UR STRIP.AUTO-MCNC: NORMAL MG/DL
WBC # BLD AUTO: 6.5 X10*3/UL (ref 4.4–11.3)
WBC #/AREA URNS AUTO: ABNORMAL /HPF

## 2024-07-23 PROCEDURE — 80061 LIPID PANEL: CPT

## 2024-07-23 PROCEDURE — 80053 COMPREHEN METABOLIC PANEL: CPT

## 2024-07-23 PROCEDURE — 83036 HEMOGLOBIN GLYCOSYLATED A1C: CPT

## 2024-07-23 PROCEDURE — 84100 ASSAY OF PHOSPHORUS: CPT

## 2024-07-23 PROCEDURE — 36415 COLL VENOUS BLD VENIPUNCTURE: CPT

## 2024-07-23 PROCEDURE — 83970 ASSAY OF PARATHORMONE: CPT

## 2024-07-23 PROCEDURE — 81001 URINALYSIS AUTO W/SCOPE: CPT

## 2024-07-23 PROCEDURE — 85025 COMPLETE CBC W/AUTO DIFF WBC: CPT

## 2024-07-24 LAB — PTH-INTACT SERPL-MCNC: 360.9 PG/ML (ref 18.5–88)

## 2024-07-26 ENCOUNTER — HOSPITAL ENCOUNTER (OUTPATIENT)
Dept: CARDIOLOGY | Facility: CLINIC | Age: 79
Discharge: HOME | End: 2024-07-26
Payer: MEDICARE

## 2024-07-26 DIAGNOSIS — I25.5 ISCHEMIC CARDIOMYOPATHY: ICD-10-CM

## 2024-07-29 ENCOUNTER — ANTICOAGULATION - WARFARIN VISIT (OUTPATIENT)
Dept: CARDIOLOGY | Facility: CLINIC | Age: 79
End: 2024-07-29
Payer: MEDICARE

## 2024-07-29 DIAGNOSIS — Z79.01 LONG TERM (CURRENT) USE OF ANTICOAGULANTS: ICD-10-CM

## 2024-07-29 DIAGNOSIS — I48.91 ATRIAL FIBRILLATION, UNSPECIFIED TYPE (MULTI): ICD-10-CM

## 2024-07-29 DIAGNOSIS — I48.20 CHRONIC ATRIAL FIBRILLATION (MULTI): ICD-10-CM

## 2024-07-29 LAB
POC INR: 2
POC PROTHROMBIN TIME: NORMAL

## 2024-07-29 PROCEDURE — 85610 PROTHROMBIN TIME: CPT | Mod: QW

## 2024-07-29 PROCEDURE — 99211 OFF/OP EST MAY X REQ PHY/QHP: CPT | Performed by: INTERNAL MEDICINE

## 2024-07-29 NOTE — PROGRESS NOTES
Patient identification verified with 2 identifiers.    Location: [facility] - [address] [place] [address] [phone]     Referring Physician: DR STAUFFER  Enrollment/ Re-enrollment date: 10/24   INR Goal: 2.0-3.0  INR monitoring is per [facility] protocol.  Anticoagulation Medication: warfarin  Indication: Atrial Fibrillation/Atrial Flutter    Subjective   Bleeding signs/symptoms: No    Bruising: No   Major bleeding event: No  Thrombosis signs/symptoms: No  Thromboembolic event: No  Missed doses: No  Extra doses: No  Medication changes: No  Dietary changes: No  Change in health: No  Change in activity: No  Alcohol: No  Other concerns: No    Upcoming Surgeries:  Does the Patient Have any upcoming surgeries that require interruption in anticoagulation therapy? no  Does the patient require bridging? no      Anticoagulation Summary  As of 2024      INR goal:  2.0-3.0   TTR:  69.3% (10.3 mo)   INR used for dosin.00 (2024)   Weekly warfarin total:  39 mg             Assessment/Plan   Therapeutic     1. New dose: no change    2. Next INR 3 weeks      Education provided to patient during the visit:  Patient instructed to call in interim with questions, concerns and changes.   Patient educated on interactions between medications and warfarin.   Patient educated on dietary consistency in vitamin k consumption.   Patient educated on affects of alcohol consumption while taking warfarin.   Patient educated on signs of bleeding/clotting.   Patient educated on compliance with dosing, follow up appointments, and prescribed plan of care.

## 2024-08-06 ENCOUNTER — TELEPHONE (OUTPATIENT)
Dept: CARDIOLOGY | Facility: HOSPITAL | Age: 79
End: 2024-08-06
Payer: MEDICARE

## 2024-08-06 DIAGNOSIS — I50.20 SYSTOLIC CONGESTIVE HEART FAILURE, UNSPECIFIED HF CHRONICITY (MULTI): Primary | ICD-10-CM

## 2024-08-07 ENCOUNTER — HOSPITAL ENCOUNTER (OUTPATIENT)
Dept: CARDIOLOGY | Facility: CLINIC | Age: 79
Discharge: HOME | End: 2024-08-07
Payer: MEDICARE

## 2024-08-07 DIAGNOSIS — Z95.810 PRESENCE OF AUTOMATIC (IMPLANTABLE) CARDIAC DEFIBRILLATOR: ICD-10-CM

## 2024-08-07 DIAGNOSIS — I25.5 ISCHEMIC CARDIOMYOPATHY: ICD-10-CM

## 2024-08-16 ENCOUNTER — HOSPITAL ENCOUNTER (OUTPATIENT)
Dept: CARDIOLOGY | Facility: CLINIC | Age: 79
Discharge: HOME | End: 2024-08-16
Payer: MEDICARE

## 2024-08-16 DIAGNOSIS — I25.5 ISCHEMIC CARDIOMYOPATHY: ICD-10-CM

## 2024-08-16 DIAGNOSIS — I48.0 PAF (PAROXYSMAL ATRIAL FIBRILLATION) (MULTI): ICD-10-CM

## 2024-08-16 DIAGNOSIS — Z95.810 PRESENCE OF AUTOMATIC CARDIOVERTER/DEFIBRILLATOR (AICD): ICD-10-CM

## 2024-08-17 LAB
BODY SURFACE AREA: 2.11 M2
BODY SURFACE AREA: 2.11 M2

## 2024-08-19 ENCOUNTER — ANTICOAGULATION - WARFARIN VISIT (OUTPATIENT)
Dept: CARDIOLOGY | Facility: CLINIC | Age: 79
End: 2024-08-19
Payer: MEDICARE

## 2024-08-19 DIAGNOSIS — Z79.01 LONG TERM (CURRENT) USE OF ANTICOAGULANTS: ICD-10-CM

## 2024-08-19 DIAGNOSIS — I48.20 CHRONIC ATRIAL FIBRILLATION (MULTI): ICD-10-CM

## 2024-08-19 DIAGNOSIS — I48.91 ATRIAL FIBRILLATION, UNSPECIFIED TYPE (MULTI): ICD-10-CM

## 2024-08-19 LAB
POC INR: 2.2
POC PROTHROMBIN TIME: NORMAL

## 2024-08-19 PROCEDURE — 99211 OFF/OP EST MAY X REQ PHY/QHP: CPT | Performed by: INTERNAL MEDICINE

## 2024-08-19 PROCEDURE — 85610 PROTHROMBIN TIME: CPT | Mod: QW

## 2024-08-19 NOTE — PROGRESS NOTES
Location: [facility] - [address] [place] [address] [phone]     Referring Physician: DR STAUFFER  Enrollment/ Re-enrollment date: 10/24   INR Goal: 2.0-3.0  INR monitoring is per [facility] protocol.  Anticoagulation Medication: warfarin  Indication: Atrial Fibrillation/Atrial Flutter    Subjective   Bleeding signs/symptoms: No    Bruising: No   Major bleeding event: No  Thrombosis signs/symptoms: No  Thromboembolic event: No  Missed doses: No  Extra doses: No  Medication changes: No  Dietary changes: No  Change in health: No  Change in activity: No  Alcohol: No  Other concerns: No    Upcoming Surgeries:  Does the Patient Have any upcoming surgeries that require interruption in anticoagulation therapy? no  Does the patient require bridging? no      Anticoagulation Summary  As of 2024      INR goal:  2.0-3.0   TTR:  71.2% (11 mo)   INR used for dosin.20 (2024)   Weekly warfarin total:  39 mg             Assessment/Plan   Therapeutic     1. New dose: no change    2. Next INR 4 weeks      Education provided to patient during the visit:  Patient instructed to call in interim with questions, concerns and changes.   Patient educated on interactions between medications and warfarin.   Patient educated on dietary consistency in vitamin k consumption.   Patient educated on affects of alcohol consumption while taking warfarin.   Patient educated on signs of bleeding/clotting.   Patient educated on compliance with dosing, follow up appointments, and prescribed plan of care.

## 2024-08-23 ENCOUNTER — HOSPITAL ENCOUNTER (OUTPATIENT)
Dept: CARDIOLOGY | Facility: CLINIC | Age: 79
Discharge: HOME | End: 2024-08-23
Payer: MEDICARE

## 2024-08-23 DIAGNOSIS — Z95.810 PRESENCE OF AUTOMATIC CARDIOVERTER/DEFIBRILLATOR (AICD): ICD-10-CM

## 2024-08-23 DIAGNOSIS — I25.5 ISCHEMIC CARDIOMYOPATHY: ICD-10-CM

## 2024-08-23 DIAGNOSIS — I48.0 PAF (PAROXYSMAL ATRIAL FIBRILLATION) (MULTI): ICD-10-CM

## 2024-08-26 ENCOUNTER — APPOINTMENT (OUTPATIENT)
Dept: PRIMARY CARE | Facility: CLINIC | Age: 79
End: 2024-08-26
Payer: MEDICARE

## 2024-08-26 ENCOUNTER — TELEPHONE (OUTPATIENT)
Dept: PRIMARY CARE | Facility: CLINIC | Age: 79
End: 2024-08-26

## 2024-08-26 DIAGNOSIS — N18.31 STAGE 3A CHRONIC KIDNEY DISEASE (MULTI): ICD-10-CM

## 2024-08-26 DIAGNOSIS — Z79.899 MEDICATION MANAGEMENT: ICD-10-CM

## 2024-08-26 DIAGNOSIS — E78.00 HYPERCHOLESTEROLEMIA: ICD-10-CM

## 2024-08-26 DIAGNOSIS — Z12.5 SCREENING FOR PROSTATE CANCER: ICD-10-CM

## 2024-08-26 DIAGNOSIS — E11.9 TYPE 2 DIABETES MELLITUS WITHOUT COMPLICATION, WITHOUT LONG-TERM CURRENT USE OF INSULIN (MULTI): ICD-10-CM

## 2024-08-26 DIAGNOSIS — I10 PRIMARY HYPERTENSION: ICD-10-CM

## 2024-08-28 DIAGNOSIS — E11.9 TYPE 2 DIABETES MELLITUS WITHOUT COMPLICATION, WITHOUT LONG-TERM CURRENT USE OF INSULIN (MULTI): ICD-10-CM

## 2024-08-29 RX ORDER — DAPAGLIFLOZIN 10 MG/1
10 TABLET, FILM COATED ORAL EVERY OTHER DAY
Qty: 30 TABLET | Refills: 0 | Status: SHIPPED | OUTPATIENT
Start: 2024-08-29 | End: 2024-10-28

## 2024-08-30 ENCOUNTER — HOSPITAL ENCOUNTER (OUTPATIENT)
Dept: CARDIOLOGY | Facility: CLINIC | Age: 79
Discharge: HOME | End: 2024-08-30
Payer: MEDICARE

## 2024-08-30 DIAGNOSIS — I48.0 PAF (PAROXYSMAL ATRIAL FIBRILLATION) (MULTI): ICD-10-CM

## 2024-08-30 DIAGNOSIS — Z95.810 PRESENCE OF AUTOMATIC CARDIOVERTER/DEFIBRILLATOR (AICD): ICD-10-CM

## 2024-08-30 DIAGNOSIS — I25.5 ISCHEMIC CARDIOMYOPATHY: ICD-10-CM

## 2024-09-04 ENCOUNTER — HOSPITAL ENCOUNTER (OUTPATIENT)
Dept: CARDIOLOGY | Facility: CLINIC | Age: 79
Discharge: HOME | End: 2024-09-04
Payer: MEDICARE

## 2024-09-04 DIAGNOSIS — Z95.810 PRESENCE OF AUTOMATIC CARDIOVERTER/DEFIBRILLATOR (AICD): ICD-10-CM

## 2024-09-04 DIAGNOSIS — I48.0 PAF (PAROXYSMAL ATRIAL FIBRILLATION) (MULTI): ICD-10-CM

## 2024-09-04 DIAGNOSIS — I25.5 ISCHEMIC CARDIOMYOPATHY: ICD-10-CM

## 2024-09-09 ENCOUNTER — ANTICOAGULATION - WARFARIN VISIT (OUTPATIENT)
Dept: CARDIOLOGY | Facility: CLINIC | Age: 79
End: 2024-09-09
Payer: MEDICARE

## 2024-09-09 ENCOUNTER — APPOINTMENT (OUTPATIENT)
Dept: PRIMARY CARE | Facility: CLINIC | Age: 79
End: 2024-09-09
Payer: MEDICARE

## 2024-09-09 VITALS
DIASTOLIC BLOOD PRESSURE: 68 MMHG | RESPIRATION RATE: 16 BRPM | OXYGEN SATURATION: 99 % | HEART RATE: 57 BPM | WEIGHT: 188 LBS | SYSTOLIC BLOOD PRESSURE: 124 MMHG | BODY MASS INDEX: 26.98 KG/M2

## 2024-09-09 DIAGNOSIS — F31.60 BIPOLAR AFFECTIVE DISORDER, CURRENT EPISODE MIXED, CURRENT EPISODE SEVERITY UNSPECIFIED (MULTI): ICD-10-CM

## 2024-09-09 DIAGNOSIS — I48.20 CHRONIC ATRIAL FIBRILLATION (MULTI): ICD-10-CM

## 2024-09-09 DIAGNOSIS — E11.9 TYPE 2 DIABETES MELLITUS WITHOUT COMPLICATION, WITHOUT LONG-TERM CURRENT USE OF INSULIN (MULTI): ICD-10-CM

## 2024-09-09 DIAGNOSIS — I10 PRIMARY HYPERTENSION: Primary | ICD-10-CM

## 2024-09-09 DIAGNOSIS — N18.32 STAGE 3B CHRONIC KIDNEY DISEASE (MULTI): ICD-10-CM

## 2024-09-09 DIAGNOSIS — I48.91 ATRIAL FIBRILLATION, UNSPECIFIED TYPE (MULTI): ICD-10-CM

## 2024-09-09 DIAGNOSIS — Z79.01 LONG TERM (CURRENT) USE OF ANTICOAGULANTS: ICD-10-CM

## 2024-09-09 DIAGNOSIS — E78.00 HYPERCHOLESTEROLEMIA: ICD-10-CM

## 2024-09-09 DIAGNOSIS — Z23 ENCOUNTER FOR IMMUNIZATION: ICD-10-CM

## 2024-09-09 LAB
POC INR: 3
POC PROTHROMBIN TIME: NORMAL

## 2024-09-09 PROCEDURE — G0008 ADMIN INFLUENZA VIRUS VAC: HCPCS | Performed by: FAMILY MEDICINE

## 2024-09-09 PROCEDURE — 90662 IIV NO PRSV INCREASED AG IM: CPT | Performed by: FAMILY MEDICINE

## 2024-09-09 PROCEDURE — 1159F MED LIST DOCD IN RCRD: CPT | Performed by: FAMILY MEDICINE

## 2024-09-09 PROCEDURE — 1036F TOBACCO NON-USER: CPT | Performed by: FAMILY MEDICINE

## 2024-09-09 PROCEDURE — 3074F SYST BP LT 130 MM HG: CPT | Performed by: FAMILY MEDICINE

## 2024-09-09 PROCEDURE — 99214 OFFICE O/P EST MOD 30 MIN: CPT | Performed by: FAMILY MEDICINE

## 2024-09-09 PROCEDURE — 85610 PROTHROMBIN TIME: CPT | Mod: QW

## 2024-09-09 PROCEDURE — 1126F AMNT PAIN NOTED NONE PRSNT: CPT | Performed by: FAMILY MEDICINE

## 2024-09-09 PROCEDURE — 3078F DIAST BP <80 MM HG: CPT | Performed by: FAMILY MEDICINE

## 2024-09-09 PROCEDURE — 99211 OFF/OP EST MAY X REQ PHY/QHP: CPT | Performed by: INTERNAL MEDICINE

## 2024-09-09 RX ORDER — CALCITRIOL 0.25 UG/1
1 CAPSULE ORAL
COMMUNITY
Start: 2024-08-01

## 2024-09-09 ASSESSMENT — PAIN SCALES - GENERAL: PAINLEVEL: 0-NO PAIN

## 2024-09-09 ASSESSMENT — PATIENT HEALTH QUESTIONNAIRE - PHQ9
2. FEELING DOWN, DEPRESSED OR HOPELESS: NOT AT ALL
1. LITTLE INTEREST OR PLEASURE IN DOING THINGS: NOT AT ALL
SUM OF ALL RESPONSES TO PHQ9 QUESTIONS 1 AND 2: 0

## 2024-09-09 ASSESSMENT — ENCOUNTER SYMPTOMS
LOSS OF SENSATION IN FEET: 0
DEPRESSION: 0
OCCASIONAL FEELINGS OF UNSTEADINESS: 0

## 2024-09-09 NOTE — ASSESSMENT & PLAN NOTE
Blood pressure is under good control.  Continue current medication following with Dr. Seymour. Recheck with me in 6 months at CPE.

## 2024-09-09 NOTE — PROGRESS NOTES
Subjective   Patient ID: Lj Valenzuela is a 79 y.o. male who presents for Diabetes (Patient is here for a DM check).    HPI   1. LJ is seen today for bipolar disorder.  He is on Lamotrigine. He is followed by Dr. Ruiz.     2. LJ is seen also for follow up of Diabetes 2.   He is on Farxiga (started 10/23). Recent A1C is 8.0 (up from 7.4) He states he has been cutting back on his carbohydrates.     3. LJ is seen today also for follow up of High cholesterol.  He is on atorvastatin 80 mg. Recent LDL is 35.     4. LJ is here also for follow up of CHF and essential hypertension.  He is on  warfarin, carvedilol, torsemide and entresto  through Dr. Whitaker . He saw Dr. Cox (Defibrillator placed in 11/14).     5. He is also here for follow-up for chronic kidney disease.  He has been following with Dr. Vidales. Recent GFR is 24 which is unchanged from where it was 6 months ago.    Review of Systems  Denies headache, blurred vision, chest pain, shortness of breath, nausea or vomiting, change in bowel habits or leg pain or swelling.    Objective   /68 (BP Location: Left arm, Patient Position: Sitting, BP Cuff Size: Large adult)   Pulse 57   Resp 16   Wt 85.3 kg (188 lb)   SpO2 99%   BMI 26.98 kg/m²     Physical Exam  Vitals and nurse's notes reviewed  General: no acute distress  HEENT: Normal  Neck: Supple  Lungs: Clear  Cardio: RRR w/o murmur  Extremities: No edema, no calf tenderness  Neuro: Alert and oriented with no focal deficits    Assessment/Plan   Problem List Items Addressed This Visit             ICD-10-CM       High    Primary hypertension - Primary I10     Blood pressure is under good control.  Continue current medication following with Dr. Seymour. Recheck with me in 6 months at CPE.         Chronic atrial fibrillation (Multi) I48.20     Continue warfarin and following with his cardiologist.         Bipolar disorder (Multi) F31.9     Continue current medication following with his  psychiatrist.         Chronic kidney disease, stage 3 unspecified (Multi) N18.30     Continue following with nephrologist.  I will try to get in touch with Dr. Vidales regarding his current renal status.         Diabetes mellitus (Multi) E11.9     A1c has increased to 8.0.  Will discuss with Dr. Figueredo before making any changes to his diabetic medication given his low GFR.         Hypercholesterolemia E78.00     Continue atorvastatin.  Recheck in 6 months at CPE.          Other Visit Diagnoses         Codes    Encounter for immunization     Z23    Relevant Orders    Flu vaccine, trivalent, preservative free, HIGH-DOSE, age 65y+ (Fluzone) (Completed)

## 2024-09-09 NOTE — ASSESSMENT & PLAN NOTE
Continue following with nephrologist.  I will try to get in touch with Dr. Vidales regarding his current renal status.

## 2024-09-09 NOTE — PROGRESS NOTES
Location: [facility] - [address] [place] [address] [phone]     Referring Physician: DR STAUFFER  Enrollment/ Re-enrollment date: 10/24   INR Goal: 2.0-3.0  INR monitoring is per [facility] protocol.  Anticoagulation Medication: warfarin  Indication: Atrial Fibrillation/Atrial Flutter    Subjective   Bleeding signs/symptoms: No    Bruising: No   Major bleeding event: No  Thrombosis signs/symptoms: No  Thromboembolic event: No  Missed doses: No  Extra doses: No  Medication changes: No  Dietary changes: No  Change in health: No  Change in activity: No  Alcohol: No  Other concerns: No    Upcoming Surgeries:  Does the Patient Have any upcoming surgeries that require interruption in anticoagulation therapy? no  Does the patient require bridging? no      Anticoagulation Summary  As of 9/9/2024      INR goal:  2.0-3.0   TTR:  73.0% (11.7 mo)   INR used for dosing:  3.00 (9/9/2024)   Weekly warfarin total:  39 mg             Assessment/Plan   Therapeutic     1. New dose: no change    2. Next INR 4 weeks      Education provided to patient during the visit:  Patient instructed to call in interim with questions, concerns and changes.   Patient educated on interactions between medications and warfarin.   Patient educated on dietary consistency in vitamin k consumption.   Patient educated on affects of alcohol consumption while taking warfarin.   Patient educated on signs of bleeding/clotting.   Patient educated on compliance with dosing, follow up appointments, and prescribed plan of care.

## 2024-09-09 NOTE — ASSESSMENT & PLAN NOTE
A1c has increased to 8.0.  Will discuss with Dr. Figueredo before making any changes to his diabetic medication given his low GFR.

## 2024-09-16 ENCOUNTER — APPOINTMENT (OUTPATIENT)
Dept: CARDIOLOGY | Facility: CLINIC | Age: 79
End: 2024-09-16
Payer: MEDICARE

## 2024-09-16 VITALS
OXYGEN SATURATION: 95 % | WEIGHT: 190.6 LBS | BODY MASS INDEX: 27.29 KG/M2 | HEART RATE: 70 BPM | SYSTOLIC BLOOD PRESSURE: 123 MMHG | RESPIRATION RATE: 16 BRPM | DIASTOLIC BLOOD PRESSURE: 73 MMHG | HEIGHT: 70 IN

## 2024-09-16 DIAGNOSIS — Z95.810 IMPLANTABLE CARDIOVERTER-DEFIBRILLATOR (ICD) IN SITU: ICD-10-CM

## 2024-09-16 DIAGNOSIS — I10 PRIMARY HYPERTENSION: ICD-10-CM

## 2024-09-16 DIAGNOSIS — Z95.5 PRESENCE OF CORONARY ANGIOPLASTY IMPLANT AND GRAFT: ICD-10-CM

## 2024-09-16 DIAGNOSIS — I51.3 LEFT VENTRICULAR THROMBUS: ICD-10-CM

## 2024-09-16 DIAGNOSIS — I13.0 HYPERTENSIVE HEART AND CHRONIC KIDNEY DISEASE WITH HEART FAILURE AND STAGE 1 THROUGH STAGE 4 CHRONIC KIDNEY DISEASE, OR UNSPECIFIED CHRONIC KIDNEY DISEASE: ICD-10-CM

## 2024-09-16 DIAGNOSIS — R06.02 SOB (SHORTNESS OF BREATH) ON EXERTION: Primary | ICD-10-CM

## 2024-09-16 PROCEDURE — 99214 OFFICE O/P EST MOD 30 MIN: CPT | Performed by: INTERNAL MEDICINE

## 2024-09-16 PROCEDURE — 1126F AMNT PAIN NOTED NONE PRSNT: CPT | Performed by: INTERNAL MEDICINE

## 2024-09-16 PROCEDURE — 3074F SYST BP LT 130 MM HG: CPT | Performed by: INTERNAL MEDICINE

## 2024-09-16 PROCEDURE — 1159F MED LIST DOCD IN RCRD: CPT | Performed by: INTERNAL MEDICINE

## 2024-09-16 PROCEDURE — 3078F DIAST BP <80 MM HG: CPT | Performed by: INTERNAL MEDICINE

## 2024-09-16 ASSESSMENT — PAIN SCALES - GENERAL: PAINLEVEL: 0-NO PAIN

## 2024-09-16 ASSESSMENT — ENCOUNTER SYMPTOMS
DEPRESSION: 0
OCCASIONAL FEELINGS OF UNSTEADINESS: 0
LOSS OF SENSATION IN FEET: 0

## 2024-09-16 NOTE — PROGRESS NOTES
History of present illness:  This is a very pleasant 79-year-old male with history of ischemic cardiomyopathy ejection fraction of 20-25%, status post ICD, NYHA class II-III. patient returns to my office for follow-up. Has been feeling much better since has been on the Farxiga and has been taking it every other day. Lower extremity edema has improved. Denies having chest pain. Has been feeling dizzy sometimes.  Past Medical History:   Diagnosis Date    AICD (automatic cardioverter/defibrillator) present 05/29/2016    Formatting of this note might be different from the original. Patient reports that this was placed and he does have history of atrial fibrillation.    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 - L sided subcortical hemorrhage. ICH score of 0. - Etiology is hypertensive and coagulopathy. - Repeat CT scan in 6 hours for stability.    Cancer (Multi)     Chronic atrial fibrillation (Multi) 09/06/2023    Chronic systolic heart failure (Multi) 09/06/2023    CKD (chronic kidney disease) 08/30/2021    Coronary artery disease 05/29/2016    Diabetes mellitus (Multi) 07/18/2018    Essential hypertension 03/24/2023    History of coronary artery stent placement 09/06/2023    Hypertension     Left ventricular thrombus 06/01/2016    Formatting of this note might be different from the original. 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 His smoking use included cigarettes. He has been exposed to tobacco smoke. He has never used smokeless tobacco. He reports current alcohol use. He reports that he does not use drugs.    Family History   Problem Relation Name Age of Onset    Other (accident) Father         Patient's Medications   New Prescriptions    No medications on file   Previous Medications    ATORVASTATIN (LIPITOR) 80 MG TABLET    Take 1 tablet (80 mg) by mouth once daily at bedtime.    BLOOD SUGAR DIAGNOSTIC (TRUETRACK TEST) STRIP    2 times a day.    CALCITRIOL (ROCALTROL) 0.25 MCG CAPSULE    1 capsule (0.25 mcg).    CARVEDILOL (COREG) 25 MG TABLET    Take 1 tablet (25 mg) by mouth 2 times a day.    CHOLECALCIFEROL (VITAMIN D3) 50 MCG (2000 UT) TABLET        DAPAGLIFLOZIN PROPANEDIOL (FARXIGA) 10 MG    Take 1 tablet (10 mg) by mouth every other day.    ENTRESTO 49-51 MG TABLET    TAKE ONE TABLET BY MOUTH TWO TIMES A DAY    LAMOTRIGINE (LAMICTAL) 200 MG TABLET    Take 1 tablet (200 mg) by mouth once daily in the morning.    TORSEMIDE (DEMADEX) 20 MG TABLET    Take 1 tablet (20 mg) by mouth 2 times a day.    WARFARIN (JANTOVEN) 6 MG TABLET    Take 1 tablet (6 mg) by mouth once daily in the evening.   Modified Medications    No medications on file   Discontinued Medications    No medications on file       Objective   Physical Exam  General: Patient in no acute distress   HEENT: Atraumatic normocephalic.  Neck: Supple, jugular venous pressure within normal limit.  No bruits  Lungs: Clear to auscultation bilaterally  Cardiovascular: Regular rate and rhythm, normal heart sounds, no murmurs rubs or gallops  Abdomen: Soft nontender nondistended.  Normal bowel sounds.  Extremities: Warm to touch, no edema.    Lab Review   Anticoagulation - Warfarin Visit on 09/09/2024   Component Date Value    POC INR 09/09/2024 3.00    Anticoagulation - Warfarin Visit on 08/19/2024   Component Date Value    POC INR 08/19/2024 2.20     Anticoagulation - Warfarin Visit on 07/29/2024   Component Date Value    POC INR 07/29/2024 2.00    Lab on 07/23/2024   Component Date Value    WBC 07/23/2024 6.5     nRBC 07/23/2024 0.0     RBC 07/23/2024 4.27 (L)     Hemoglobin 07/23/2024 12.0 (L)     Hematocrit 07/23/2024 39.5 (L)     MCV 07/23/2024 93     MCH 07/23/2024 28.1     MCHC 07/23/2024 30.4 (L)     RDW 07/23/2024 14.9 (H)     Platelets 07/23/2024 210     Neutrophils % 07/23/2024 80.6     Immature Granulocytes %,* 07/23/2024 0.6     Lymphocytes % 07/23/2024 8.5     Monocytes % 07/23/2024 7.2     Eosinophils % 07/23/2024 2.3     Basophils % 07/23/2024 0.8     Neutrophils Absolute 07/23/2024 5.24     Immature Granulocytes Ab* 07/23/2024 0.04     Lymphocytes Absolute 07/23/2024 0.55 (L)     Monocytes Absolute 07/23/2024 0.47     Eosinophils Absolute 07/23/2024 0.15     Basophils Absolute 07/23/2024 0.05     Glucose 07/23/2024 173 (H)     Sodium 07/23/2024 142     Potassium 07/23/2024 4.9     Chloride 07/23/2024 103     Bicarbonate 07/23/2024 25     Urea Nitrogen 07/23/2024 44 (H)     Creatinine 07/23/2024 2.60 (H)     eGFR 07/23/2024 24 (L)     Calcium 07/23/2024 8.9     Albumin 07/23/2024 4.2     Alkaline Phosphatase 07/23/2024 94     Total Protein 07/23/2024 6.1     AST 07/23/2024 13     Bilirubin, Total 07/23/2024 0.7     ALT 07/23/2024 16     Anion Gap 07/23/2024 14     Hemoglobin A1C 07/23/2024 8.0 (H)     Estimated Average Glucose 07/23/2024 183     Cholesterol 07/23/2024 89 (L)     HDL-Cholesterol 07/23/2024 24.0 (L)     Cholesterol/HDL Ratio 07/23/2024 3.7     LDL Calculated 07/23/2024 35 (L)     Triglycerides 07/23/2024 152 (H)     Color, Urine 07/23/2024 Light-Yellow     Appearance, Urine 07/23/2024 Clear     Specific Gravity, Urine 07/23/2024 1.010     pH, Urine 07/23/2024 5.5     Protein, Urine 07/23/2024 30 (1+) (A)     Glucose, Urine 07/23/2024 500 (3+) (A)     Blood, Urine 07/23/2024 NEGATIVE     Ketones, Urine 07/23/2024 NEGATIVE      Bilirubin, Urine 07/23/2024 NEGATIVE     Urobilinogen, Urine 07/23/2024 Normal     Nitrite, Urine 07/23/2024 NEGATIVE     Leukocyte Esterase, Urine 07/23/2024 75 Edis/µL (A)     Parathyroid Hormone, Int* 07/23/2024 360.9 (H)     Phosphorus 07/23/2024 4.6 (H)     WBC, Urine 07/23/2024 6-10 (A)     RBC, Urine 07/23/2024 3-5     Mucus, Urine 07/23/2024 FEW     Hyaline Casts, Urine 07/23/2024 1+ (A)         Assessment/Plan   Patient Active Problem List   Diagnosis    Primary hypertension    Chronic atrial fibrillation (Multi)    Bipolar disorder (Multi)    Cardiomyopathy (Multi)    Cerebrovascular accident (Multi)    Chronic CHF (Multi)    Chronic systolic heart failure (Multi)    Chronic kidney disease, stage 3 unspecified (Multi)    Constipation    Coronary artery disease    Diabetes mellitus (Multi)    Hypercholesterolemia    History of coronary artery stent placement    Implantable cardioverter-defibrillator (ICD) in situ    Generalized ischemic myocardial dysfunction    Angina pectoris (CMS-HCC)    Umbilical hernia    Long term (current) use of anticoagulants    Atrial fibrillation (Multi)    Basal ganglia hemorrhage (Multi)    Dysarthria    Left ventricular thrombus    Acute renal failure (CMS-HCC)    Presence of coronary angioplasty implant and graft    Personal history of nicotine dependence    Other specified disorders of kidney and ureter    Neoplasm of uncertain behavior of left kidney    Hypertensive heart and chronic kidney disease with heart failure and stage 1 through stage 4 chronic kidney disease, or unspecified chronic kidney disease (Multi)    Contusion of finger of right hand, initial encounter    Benign neoplasm of left kidney      This is a very pleasant 79-year-old male with history of ischemic cardiomyopathy ejection fraction of 20-25%, status post ICD, NYHA class II-III. patient returns to my office for follow-up. Has been feeling much better since has been on the Farxiga and has been taking it  every other day. Lower extremity edema has improved. Denies having chest pain. Has been feeling dizzy sometimes. Blood pressure running on the low normal. Reviewed his last kidney function creatinine is 2.6. Has worsened slightly from baseline. At this point my recommendation to Continue other medications. Remains symptomatic NYHA class III but I do not expect much improvement at this point with longstanding history of heart failure on optimal medical therapy. Will follow-up in 3 months.       Rosamaria Seymour MD

## 2024-09-18 ENCOUNTER — HOSPITAL ENCOUNTER (OUTPATIENT)
Dept: CARDIOLOGY | Facility: CLINIC | Age: 79
Discharge: HOME | End: 2024-09-18
Payer: MEDICARE

## 2024-09-18 DIAGNOSIS — I25.5 ISCHEMIC CARDIOMYOPATHY: ICD-10-CM

## 2024-09-18 DIAGNOSIS — I48.0 PAF (PAROXYSMAL ATRIAL FIBRILLATION) (MULTI): ICD-10-CM

## 2024-09-18 DIAGNOSIS — Z95.810 PRESENCE OF AUTOMATIC CARDIOVERTER/DEFIBRILLATOR (AICD): ICD-10-CM

## 2024-09-23 ENCOUNTER — HOSPITAL ENCOUNTER (OUTPATIENT)
Dept: CARDIOLOGY | Facility: CLINIC | Age: 79
Discharge: HOME | End: 2024-09-23
Payer: MEDICARE

## 2024-09-23 DIAGNOSIS — I48.0 PAF (PAROXYSMAL ATRIAL FIBRILLATION) (MULTI): ICD-10-CM

## 2024-09-23 DIAGNOSIS — Z95.810 PRESENCE OF AUTOMATIC CARDIOVERTER/DEFIBRILLATOR (AICD): ICD-10-CM

## 2024-09-23 DIAGNOSIS — I25.5 ISCHEMIC CARDIOMYOPATHY: ICD-10-CM

## 2024-10-02 ENCOUNTER — HOSPITAL ENCOUNTER (OUTPATIENT)
Dept: CARDIOLOGY | Facility: CLINIC | Age: 79
Discharge: HOME | End: 2024-10-02
Payer: MEDICARE

## 2024-10-02 DIAGNOSIS — Z95.810 PRESENCE OF AUTOMATIC CARDIOVERTER/DEFIBRILLATOR (AICD): ICD-10-CM

## 2024-10-02 DIAGNOSIS — I48.0 PAF (PAROXYSMAL ATRIAL FIBRILLATION) (MULTI): ICD-10-CM

## 2024-10-02 DIAGNOSIS — I25.5 ISCHEMIC CARDIOMYOPATHY: ICD-10-CM

## 2024-10-07 ENCOUNTER — ANTICOAGULATION - WARFARIN VISIT (OUTPATIENT)
Dept: CARDIOLOGY | Facility: CLINIC | Age: 79
End: 2024-10-07
Payer: MEDICARE

## 2024-10-07 DIAGNOSIS — I48.20 CHRONIC ATRIAL FIBRILLATION (MULTI): ICD-10-CM

## 2024-10-07 DIAGNOSIS — Z79.01 LONG TERM (CURRENT) USE OF ANTICOAGULANTS: ICD-10-CM

## 2024-10-07 DIAGNOSIS — I48.91 ATRIAL FIBRILLATION, UNSPECIFIED TYPE (MULTI): ICD-10-CM

## 2024-10-07 LAB
POC INR: 2.2
POC PROTHROMBIN TIME: NORMAL

## 2024-10-07 PROCEDURE — 85610 PROTHROMBIN TIME: CPT | Mod: QW

## 2024-10-07 NOTE — PROGRESS NOTES
Location: [facility] - [address] [place] [address] [phone]     Referring Physician: DR STAUFFER  Enrollment/ Re-enrollment date: 10/24   INR Goal: 2.0-3.0  INR monitoring is per [facility] protocol.  Anticoagulation Medication: warfarin  Indication: Atrial Fibrillation/Atrial Flutter    Subjective   Bleeding signs/symptoms: No    Bruising: No   Major bleeding event: No  Thrombosis signs/symptoms: No  Thromboembolic event: No  Missed doses: No  Extra doses: No  Medication changes: No  Dietary changes: No  Change in health: No  Change in activity: No  Alcohol: No  Other concerns: No    Upcoming Surgeries:  Does the Patient Have any upcoming surgeries that require interruption in anticoagulation therapy? no  Does the patient require bridging? no      Anticoagulation Summary  As of 10/7/2024      INR goal:  2.0-3.0   TTR:  74.9% (1 y)   INR used for dosin.20 (10/7/2024)   Weekly warfarin total:  39 mg             Assessment/Plan   Therapeutic     1. New dose: no change    2. Next INR: 1 month      Education provided to patient during the visit:  Patient instructed to call in interim with questions, concerns and changes.   Patient educated on interactions between medications and warfarin.   Patient educated on dietary consistency in vitamin k consumption.   Patient educated on affects of alcohol consumption while taking warfarin.   Patient educated on signs of bleeding/clotting.   Patient educated on compliance with dosing, follow up appointments, and prescribed plan of care.

## 2024-10-16 ENCOUNTER — HOSPITAL ENCOUNTER (OUTPATIENT)
Dept: CARDIOLOGY | Facility: CLINIC | Age: 79
Discharge: HOME | End: 2024-10-16
Payer: MEDICARE

## 2024-10-16 DIAGNOSIS — I25.5 ISCHEMIC CARDIOMYOPATHY: ICD-10-CM

## 2024-10-16 DIAGNOSIS — I48.0 PAF (PAROXYSMAL ATRIAL FIBRILLATION) (MULTI): ICD-10-CM

## 2024-10-16 DIAGNOSIS — Z95.810 PRESENCE OF AUTOMATIC CARDIOVERTER/DEFIBRILLATOR (AICD): ICD-10-CM

## 2024-10-17 DIAGNOSIS — E11.9 TYPE 2 DIABETES MELLITUS WITHOUT COMPLICATION, WITHOUT LONG-TERM CURRENT USE OF INSULIN (MULTI): ICD-10-CM

## 2024-10-17 DIAGNOSIS — I50.9 CHF (CONGESTIVE HEART FAILURE): ICD-10-CM

## 2024-10-17 RX ORDER — DAPAGLIFLOZIN 10 MG/1
10 TABLET, FILM COATED ORAL EVERY OTHER DAY
Qty: 30 TABLET | Refills: 3 | Status: SHIPPED | OUTPATIENT
Start: 2024-10-17

## 2024-10-17 NOTE — TELEPHONE ENCOUNTER
Please send the attached prescription to the patient's pharmacy as soon as possible. Thank you!    Requested Prescriptions     Pending Prescriptions Disp Refills    Jantoven 6 mg tablet [Pharmacy Med Name: Jantoven Oral Tablet 6 MG] 90 tablet 3     Sig: TAKE ONE TABLET BY MOUTH ONCE DAILY IN THE EVENING    atorvastatin (Lipitor) 80 mg tablet [Pharmacy Med Name: Atorvastatin Calcium Oral Tablet 80 MG] 90 tablet 3     Sig: TAKE ONE TABLET BY MOUTH ONCE DAILY AT BEDTIME

## 2024-10-18 RX ORDER — ATORVASTATIN CALCIUM 80 MG/1
80 TABLET, FILM COATED ORAL NIGHTLY
Qty: 90 TABLET | Refills: 3 | Status: SHIPPED | OUTPATIENT
Start: 2024-10-18

## 2024-10-18 RX ORDER — WARFARIN SODIUM 6 MG/1
6 TABLET ORAL EVERY EVENING
Qty: 90 TABLET | Refills: 3 | Status: SHIPPED | OUTPATIENT
Start: 2024-10-18

## 2024-10-21 ENCOUNTER — HOSPITAL ENCOUNTER (OUTPATIENT)
Dept: CARDIOLOGY | Facility: CLINIC | Age: 79
Discharge: HOME | End: 2024-10-21
Payer: MEDICARE

## 2024-10-21 DIAGNOSIS — I25.5 ISCHEMIC CARDIOMYOPATHY: ICD-10-CM

## 2024-10-21 DIAGNOSIS — I48.0 PAF (PAROXYSMAL ATRIAL FIBRILLATION) (MULTI): ICD-10-CM

## 2024-10-21 DIAGNOSIS — Z95.810 PRESENCE OF AUTOMATIC CARDIOVERTER/DEFIBRILLATOR (AICD): ICD-10-CM

## 2024-10-21 PROCEDURE — 93296 REM INTERROG EVL PM/IDS: CPT

## 2024-11-04 ENCOUNTER — ANTICOAGULATION - WARFARIN VISIT (OUTPATIENT)
Dept: CARDIOLOGY | Facility: CLINIC | Age: 79
End: 2024-11-04
Payer: MEDICARE

## 2024-11-04 DIAGNOSIS — Z79.01 LONG TERM (CURRENT) USE OF ANTICOAGULANTS: ICD-10-CM

## 2024-11-04 DIAGNOSIS — I48.20 CHRONIC ATRIAL FIBRILLATION (MULTI): ICD-10-CM

## 2024-11-04 DIAGNOSIS — I48.91 ATRIAL FIBRILLATION, UNSPECIFIED TYPE (MULTI): ICD-10-CM

## 2024-11-04 LAB
POC INR: 2.9
POC PROTHROMBIN TIME: NORMAL

## 2024-11-04 PROCEDURE — 99211 OFF/OP EST MAY X REQ PHY/QHP: CPT | Performed by: INTERNAL MEDICINE

## 2024-11-04 PROCEDURE — 85610 PROTHROMBIN TIME: CPT | Mod: QW

## 2024-11-04 NOTE — PROGRESS NOTES
Location: [facility] - [address] [place] [address] [phone]     Referring Physician: DR STAUFFER  Enrollment/ Re-enrollment date: 10/24   INR Goal: 2.0-3.0  INR monitoring is per [facility] protocol.  Anticoagulation Medication: warfarin  Indication: Atrial Fibrillation/Atrial Flutter    Subjective   Bleeding signs/symptoms: No    Bruising: No   Major bleeding event: No  Thrombosis signs/symptoms: No  Thromboembolic event: No  Missed doses: No  Extra doses: No  Medication changes: No  Dietary changes: No  Change in health: No  Change in activity: No  Alcohol: No  Other concerns: No    Upcoming Surgeries:  Does the Patient Have any upcoming surgeries that require interruption in anticoagulation therapy? no  Does the patient require bridging? no      Anticoagulation Summary  As of 2024      INR goal:  2.0-3.0   TTR:  76.7% (1.1 y)   INR used for dosin.90 (2024)   Weekly warfarin total:  39 mg               Assessment/Plan   Therapeutic     1. New dose: no change    2. Next INR: 1 month      Education provided to patient during the visit:  Patient instructed to call in interim with questions, concerns and changes.   Patient educated on interactions between medications and warfarin.   Patient educated on dietary consistency in vitamin k consumption.   Patient educated on affects of alcohol consumption while taking warfarin.   Patient educated on signs of bleeding/clotting.   Patient educated on compliance with dosing, follow up appointments, and prescribed plan of care.

## 2024-11-13 ENCOUNTER — HOSPITAL ENCOUNTER (OUTPATIENT)
Dept: CARDIOLOGY | Facility: CLINIC | Age: 79
Discharge: HOME | End: 2024-11-13
Payer: MEDICARE

## 2024-11-13 DIAGNOSIS — I25.5 ISCHEMIC CARDIOMYOPATHY: ICD-10-CM

## 2024-11-13 DIAGNOSIS — Z95.810 PRESENCE OF AUTOMATIC CARDIOVERTER/DEFIBRILLATOR (AICD): ICD-10-CM

## 2024-11-13 DIAGNOSIS — I48.0 PAF (PAROXYSMAL ATRIAL FIBRILLATION) (MULTI): ICD-10-CM

## 2024-11-18 ENCOUNTER — HOSPITAL ENCOUNTER (OUTPATIENT)
Dept: CARDIOLOGY | Facility: CLINIC | Age: 79
Discharge: HOME | End: 2024-11-18
Payer: MEDICARE

## 2024-11-18 DIAGNOSIS — I48.0 PAF (PAROXYSMAL ATRIAL FIBRILLATION) (MULTI): ICD-10-CM

## 2024-11-18 DIAGNOSIS — I25.5 ISCHEMIC CARDIOMYOPATHY: ICD-10-CM

## 2024-11-18 DIAGNOSIS — Z95.810 PRESENCE OF AUTOMATIC CARDIOVERTER/DEFIBRILLATOR (AICD): ICD-10-CM

## 2024-11-20 ENCOUNTER — HOSPITAL ENCOUNTER (OUTPATIENT)
Dept: CARDIOLOGY | Facility: CLINIC | Age: 79
Discharge: HOME | End: 2024-11-20
Payer: MEDICARE

## 2024-11-20 ENCOUNTER — TELEPHONE (OUTPATIENT)
Dept: CARDIOLOGY | Facility: CLINIC | Age: 79
End: 2024-11-20
Payer: MEDICARE

## 2024-11-20 DIAGNOSIS — I48.0 PAF (PAROXYSMAL ATRIAL FIBRILLATION) (MULTI): ICD-10-CM

## 2024-11-20 DIAGNOSIS — Z95.810 PRESENCE OF AUTOMATIC CARDIOVERTER/DEFIBRILLATOR (AICD): ICD-10-CM

## 2024-11-20 DIAGNOSIS — I48.0 PAROXYSMAL ATRIAL FIBRILLATION (MULTI): Primary | ICD-10-CM

## 2024-11-20 DIAGNOSIS — I25.5 ISCHEMIC CARDIOMYOPATHY: ICD-10-CM

## 2024-11-25 ENCOUNTER — HOSPITAL ENCOUNTER (OUTPATIENT)
Dept: CARDIOLOGY | Facility: CLINIC | Age: 79
Discharge: HOME | End: 2024-11-25
Payer: MEDICARE

## 2024-11-25 DIAGNOSIS — I25.5 ISCHEMIC CARDIOMYOPATHY: ICD-10-CM

## 2024-11-25 DIAGNOSIS — I50.9 CHF (CONGESTIVE HEART FAILURE): ICD-10-CM

## 2024-11-25 DIAGNOSIS — Z95.810 PRESENCE OF AUTOMATIC CARDIOVERTER/DEFIBRILLATOR (AICD): ICD-10-CM

## 2024-11-25 DIAGNOSIS — I48.0 PAF (PAROXYSMAL ATRIAL FIBRILLATION) (MULTI): ICD-10-CM

## 2024-11-25 RX ORDER — TORSEMIDE 20 MG/1
20 TABLET ORAL 2 TIMES DAILY
Qty: 60 TABLET | Refills: 11 | Status: SHIPPED | OUTPATIENT
Start: 2024-11-25 | End: 2025-11-25

## 2024-11-25 NOTE — TELEPHONE ENCOUNTER
Please send the attached prescription to the patient's pharmacy as soon as possible. Thank you!    Requested Prescriptions     Pending Prescriptions Disp Refills    torsemide (Demadex) 20 mg tablet 60 tablet 11     Sig: Take 1 tablet (20 mg) by mouth 2 times a day.

## 2024-11-26 ENCOUNTER — LAB (OUTPATIENT)
Dept: LAB | Facility: LAB | Age: 79
End: 2024-11-26
Payer: MEDICARE

## 2024-11-26 DIAGNOSIS — I48.0 PAROXYSMAL ATRIAL FIBRILLATION (MULTI): ICD-10-CM

## 2024-11-26 DIAGNOSIS — N18.30 CHRONIC KIDNEY DISEASE, STAGE 3 UNSPECIFIED (MULTI): Primary | ICD-10-CM

## 2024-11-26 LAB
ALBUMIN SERPL BCP-MCNC: 3.9 G/DL (ref 3.4–5)
ANION GAP SERPL CALCULATED.3IONS-SCNC: 11 MMOL/L (ref 10–20)
BUN SERPL-MCNC: 50 MG/DL (ref 6–23)
CALCIUM SERPL-MCNC: 8.7 MG/DL (ref 8.6–10.3)
CHLORIDE SERPL-SCNC: 107 MMOL/L (ref 98–107)
CO2 SERPL-SCNC: 30 MMOL/L (ref 21–32)
CREAT SERPL-MCNC: 2.62 MG/DL (ref 0.5–1.3)
EGFRCR SERPLBLD CKD-EPI 2021: 24 ML/MIN/1.73M*2
ERYTHROCYTE [DISTWIDTH] IN BLOOD BY AUTOMATED COUNT: 15.8 % (ref 11.5–14.5)
GLUCOSE SERPL-MCNC: 197 MG/DL (ref 74–99)
HCT VFR BLD AUTO: 39.3 % (ref 41–52)
HGB BLD-MCNC: 11.9 G/DL (ref 13.5–17.5)
MAGNESIUM SERPL-MCNC: 2.71 MG/DL (ref 1.6–2.4)
MCH RBC QN AUTO: 28.5 PG (ref 26–34)
MCHC RBC AUTO-ENTMCNC: 30.3 G/DL (ref 32–36)
MCV RBC AUTO: 94 FL (ref 80–100)
NRBC BLD-RTO: 0 /100 WBCS (ref 0–0)
PHOSPHATE SERPL-MCNC: 4.3 MG/DL (ref 2.5–4.9)
PLATELET # BLD AUTO: 179 X10*3/UL (ref 150–450)
POTASSIUM SERPL-SCNC: 4.9 MMOL/L (ref 3.5–5.3)
PTH-INTACT SERPL-MCNC: 242.9 PG/ML (ref 18.5–88)
RBC # BLD AUTO: 4.18 X10*6/UL (ref 4.5–5.9)
SODIUM SERPL-SCNC: 143 MMOL/L (ref 136–145)
WBC # BLD AUTO: 5 X10*3/UL (ref 4.4–11.3)

## 2024-11-26 PROCEDURE — 80069 RENAL FUNCTION PANEL: CPT

## 2024-11-26 PROCEDURE — 83970 ASSAY OF PARATHORMONE: CPT

## 2024-11-26 PROCEDURE — 82306 VITAMIN D 25 HYDROXY: CPT

## 2024-11-26 PROCEDURE — 83735 ASSAY OF MAGNESIUM: CPT

## 2024-11-26 PROCEDURE — 36415 COLL VENOUS BLD VENIPUNCTURE: CPT

## 2024-11-26 PROCEDURE — 85027 COMPLETE CBC AUTOMATED: CPT

## 2024-11-27 LAB — 25(OH)D3 SERPL-MCNC: 64 NG/ML (ref 30–100)

## 2024-11-27 NOTE — H&P
History Of Present Illness  Lj Valenzuela is a 79 y.o. male presenting with paroxysmal atrial fib. PMH of ischemic cardiomyopathy, HFrEF, ejection fraction of 20-25%, status post AICD, NYHA class II-III, CKD stage 4,  HTN, CAD, angina, left ventricular thrombus,   Pt takes daily Jantoven  POC INR-2.6.   Device check shows atrial fibrillation    Past Medical History:  Past Medical History:   Diagnosis Date    AICD (automatic cardioverter/defibrillator) present 05/29/2016    Formatting of this note might be different from the original. Patient reports that this was placed and he does have history of atrial fibrillation.    Angina pectoris (CMS-HCC) 09/06/2023    Basal ganglia hemorrhage (Multi) 05/29/2016    Formatting of this note might be different from the original. - L sided subcortical hemorrhage. ICH score of 0. - Etiology is hypertensive and coagulopathy. - Repeat CT scan in 6 hours for stability.    Cancer (Multi)     Chronic atrial fibrillation (Multi) 09/06/2023    Chronic systolic heart failure (Multi) 09/06/2023    CKD (chronic kidney disease) 08/30/2021    Coronary artery disease 05/29/2016    Diabetes mellitus (Multi) 07/18/2018    Essential hypertension 03/24/2023    History of coronary artery stent placement 09/06/2023    Hypertension     Left ventricular thrombus 06/01/2016    Formatting of this note might be different from the original. 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 His  smoking use included cigarettes. He has been exposed to tobacco smoke. He has never used smokeless tobacco. He reports current alcohol use. He reports that he does not use drugs.     Family History:  Family History   Problem Relation Name Age of Onset    Other (accident) Father          Allergies:  No Known Allergies     Home Medications:  Current Outpatient Medications   Medication Instructions    atorvastatin (LIPITOR) 80 mg, oral, Nightly    blood sugar diagnostic (Truetrack Test) strip 2 times daily    calcitriol (Rocaltrol) 0.25 mcg capsule 1 capsule    carvedilol (COREG) 25 mg, oral, 2 times daily    cholecalciferol (Vitamin D3) 50 MCG (2000 UT) tablet     Entresto 49-51 mg tablet 1 tablet, oral, 2 times daily    Farxiga 10 mg, oral, Every other day    Jantoven 6 mg, oral, Every evening    lamoTRIgine (LAMICTAL) 200 mg, Every morning    torsemide (DEMADEX) 20 mg, oral, 2 times daily       Inpatient Medications:  Scheduled medications   Medication Dose Route Frequency    oxygen   inhalation Continuous - 02/gases     PRN medications   Medication     Continuous Medications   Medication Dose Last Rate         Review of Systems   Constitutional:  Positive for fatigue.   Respiratory:  Positive for shortness of breath.    Cardiovascular:  Positive for palpitations and leg swelling. Negative for chest pain.   Neurological:  Negative for dizziness and light-headedness.          Physical Exam  Constitutional:       General: He is awake. He is not in acute distress.     Appearance: Normal appearance. He is not ill-appearing.   HENT:      Head: Normocephalic and atraumatic.      Mouth/Throat:      Mouth: Mucous membranes are moist.      Pharynx: Oropharynx is clear.   Cardiovascular:      Rate and Rhythm: Normal rate. Rhythm irregular.      Pulses:           Radial pulses are 2+ on the right side and 2+ on the left side.        Dorsalis pedis pulses are 2+ on the right side and 2+ on the left side.      Heart sounds:  "Normal heart sounds. No murmur heard.  Pulmonary:      Effort: Pulmonary effort is normal.      Breath sounds: Normal breath sounds.   Abdominal:      General: Bowel sounds are normal.      Palpations: Abdomen is soft.   Musculoskeletal:      Cervical back: Normal range of motion and neck supple.      Right lower leg: Edema present.      Left lower leg: Edema present.   Skin:     General: Skin is warm and dry.      Capillary Refill: Capillary refill takes less than 2 seconds.   Neurological:      General: No focal deficit present.      Mental Status: He is alert and oriented to person, place, and time. Mental status is at baseline.      Cranial Nerves: Cranial nerves 2-12 are intact.   Psychiatric:         Mood and Affect: Mood and affect normal.         Behavior: Behavior normal.        Sedation Plan    ASA 3     Mallampati class: II.           NPO since 1800 on 12/3/2024.      Last Recorded Vitals  Blood pressure 118/71, pulse 70, temperature 36.3 °C (97.3 °F), temperature source Temporal, resp. rate 16, height 1.778 m (5' 10\"), weight 86.2 kg (190 lb), SpO2 97%.         Vitals from the Past 24 Hours  Heart Rate:  [70]   Temp:  [36.3 °C (97.3 °F)]   Resp:  [16]   BP: (118)/(71)   Height:  [177.8 cm (5' 10\")]   Weight:  [86.2 kg (190 lb)]   SpO2:  [97 %]          Relevant Results    Labs    CBC:   Recent Labs     11/26/24  1419 07/23/24  1502 03/25/24  1507 07/05/23  0745 05/03/23  1632 03/20/23  1005   WBC 5.0 6.5 11.0 5.4 5.9 6.2   HGB 11.9* 12.0* 12.9* 11.8* 11.9* 11.7*   HCT 39.3* 39.5* 41.5 37.3* 38.4* 39.0*    210 235 153 155 147*   MCV 94 93 90 88.6 90.1 92.6     BMP/CMP:   Recent Labs     11/26/24  1419 07/23/24  1502 03/25/24  1507 01/30/24  0836 10/17/23  0918 06/28/23  1453 05/03/23  1632 03/20/23  1005 11/18/22  1000 09/07/22  1321 07/26/22  1121 03/11/22  0839    142 141 147* 147* 143   < > 144   < > 145   < > 145  145   K 4.9 4.9 4.2 4.4 4.9 5.0   < > 5.2*   < > 5.0   < > 4.8  4.8   CL " "107 103 102 106 106 108*   < > 109*   < > 109*   < > 110*  110*   BUN 50* 44* 61* 46* 37* 35*   < > 28*   < > 27*   < > 34*  34*   CREATININE 2.62* 2.60* 2.67* 2.60* 2.20* 2.0*   < > 1.8*   < > 1.8*   < > 2.0*  2.0*   CO2 30 25 29 25 28 25   < > 21*   < > 25   < > 23*  23*   CALCIUM 8.7 8.9 9.0 9.2 9.5 9.5   < > 9.1   < > 9.3   < > 8.8  8.8   PROT  --  6.1  --  6.4 6.2  --   --  6.6  --  7.1  --  6.0   BILITOT  --  0.7  --  0.6 0.6  --   --  0.7  --  0.5  --  0.4   ALKPHOS  --  94  --  70 71  --   --  76  --  88  --  65   ALT  --  16  --  13 26  --   --  15  --  18  --  15   AST  --  13  --  11 27  --   --  15  --  16  --  14   GLUCOSE 197* 173* 213* 146* 174* 166*   < > 172*   < > 131*   < > 151*  151*    < > = values in this interval not displayed.      Magnesium:   Recent Labs     11/26/24  1419   MG 2.71*     Lipid Panel:   Recent Labs     07/23/24  1502 01/30/24  0836 03/20/23  1005 03/11/22  0839 08/24/21  0826 03/01/21  0921 09/01/20  0818 03/23/20  1341 03/20/20  0833   CHOL 89* 116* 116* 115* 114* 122* 106* 126* 133   HDL 24.0* 29.0* 27* 28* 25* 30* 29* 29* 31*   CHHDL 3.7 4.0 4.3 4.1 4.6 4.1 3.7 4.3 4.3   TRIG 152* 167* 138 135 167* 154* 123 227* 165*     Cardiac       No lab exists for component: \"CK\", \"CKMBP\"   Hemoglobin A1C:   Recent Labs     07/23/24  1502 01/30/24  0836 10/17/23  0918 03/20/23  1005 09/07/22  1321 03/11/22  0839 08/24/21  0826 03/01/21  0921 09/01/20  0818 03/23/20  1341 03/20/20  0833 07/27/19  0544   HGBA1C 8.0* 7.4* 8.1* 7.2* 7.1* 7.1* 7.1* 7.0* 6.8* 6.8* 6.7* 6.8*     TSH/ Free T4:   Recent Labs     03/11/22  0839 03/01/21  0921 03/23/20  1341 03/20/20  0833   TSH 2.09 2.32 1.68 1.93     Iron: No results for input(s): \"FERRITIN\", \"TIBC\", \"IRONSAT\", \"BNP\" in the last 99793 hours.  Coag:   Results from last 7 days   Lab Units 12/04/24  1419 12/03/24  1351   POC INR ME   --  2.90   POCT INTERNATIONAL NORMALIZATION RATIO  2.6  --      ABO: No results found for: \"ABO\"    Past " "Cardiology Tests (Last 3 Years):    EKG:  No results for input(s): \"ATRRATE\", \"VENTRATE\", \"PRINT\", \"QRSDUR\", \"QTCFRED\", \"QTCCALCB\" in the last 64944 hours.No results found for this or any previous visit (from the past 4464 hours).  Echo:  Echocardiogram:   Echocardiogram     Narrative  PROCEDURE:         ECHO 2-D WO CONTRAST - IEC  0010  REASON FOR EXAM: cardiomyopathy    RESULT: [id number]    [place] [address]  Phone [phone]    TRANSTHORACIC ECHOCARDIOGRAM REPORT      Patient Name:     FAISAL HERRERA Reading Physician:   [id number] Rosamaria Seymour MD  Study Date:       12/5/2022       Referring Physician: Yomaira Seymour MD  MRN/PID:          [id number]        PCP:  Accession/Order#: [id number]      Department Location: [facility]  YOB: 1945        Fellow:  Gender:           M               Nurse:  Admit Date:                       Sonographer:         Sadaf Hawkins [facility]  Admission Status: Outpatient      Additional Staff:  Height:           177.80 cm       CC Report to:  Weight:           86.18 kg        Study Type:          ECHO 2-D WO CONTRAST  BSA:              2.04 m2  Blood Pressure: 130 /70 mmHg    Diagnosis/ICD: I42.9-Cardiomyopathy, unspecified  Indication:    Cardiomyopathy  Procedure/CPT: Echo Complete w Full Doppler-[id number]  Study Detail: The following Echo studies were performed: 2D, M-Mode, Doppler  and  color flow.      PHYSICIAN INTERPRETATION:  Left Ventricle: Left ventricular systolic function is severely decreased,  with an estimated ejection fraction of 20-25%. There is global hypokinesis  of the left ventricle with minor regional variations. The left ventricular  cavity size is mildly dilated. Abnormal (paradoxical) septal motion,  consistent with left bundle branch block. Spectral Doppler shows a normal  pattern of left ventricular diastolic filling.  Left Atrium: The left atrium " is mildly dilated.  Right Ventricle: The right ventricle is normal in size. There is normal  right ventricular global systolic function. A device is visualized in the  right ventricle.  Right Atrium: The right atrium is mildly dilated.  Aortic Valve: The aortic valve appears structurally normal. There is mild  aortic valve regurgitation. The peak instantaneous gradient of the aortic  valve is 7.7 mmHg.  Mitral Valve: The mitral valve is normal in structure. There is mild mitral  valve regurgitation.  Tricuspid Valve: The tricuspid valve is structurally normal. There is mild  tricuspid regurgitation. The right ventricular systolic pressure is unable  to be estimated.  Pulmonic Valve: The pulmonic valve is structurally normal. There is mild  pulmonic valve regurgitation.  Pericardium: There is a trivial pericardial effusion.  Aorta: The aortic root is normal.  Systemic Veins: The inferior vena cava appears to be of normal size. There  is IVC inspiratory collapse greater than 50%.      CONCLUSIONS:  1. Left ventricular systolic function is severely decreased with a 20-25%  estimated ejection fraction.  2. Abnormal septal motion consistent with left bundle branch block.  3. Mild aortic valve regurgitation.  4. Will consider CRT if patient not better with mediucal therapy.  5. There is global hypokinesis of the left ventricle with minor regional  variations.    QUANTITATIVE DATA SUMMARY:  2D MEASUREMENTS:  Normal Ranges:  LAs:           3.50 cm   (2.7-4.0cm)  IVSd:          0.64 cm   (0.6-1.1cm)  LVPWd:         0.69 cm   (0.6-1.1cm)  LVIDd:         6.31 cm   (3.9-5.9cm)  LVIDs:         5.57 cm  LV Mass Index: 79.4 g/m2  LV % FS        11.7 %    LA VOLUME:  Normal Ranges:  LA Volume Index: 37.6 ml/m2    RA VOLUME BY A/L METHOD:  Normal Ranges:  RA Area A4C: 29.0 cm2    M-MODE MEASUREMENTS:  Normal Ranges:  Ao Root: 3.70 cm (2.0-3.7cm)    AORTA MEASUREMENTS:  Normal Ranges:  Asc Ao, d: 3.00 cm (2.1-3.4cm)    LV SYSTOLIC  "FUNCTION BY 2D PLANIMETRY (MOD):  Normal Ranges:  EF-A4C View: 24.6 % (>=55%)    LV DIASTOLIC FUNCTION:  Normal Ranges:  MV Peak E:    0.96 m/s (0.7-1.2 m/s)  MV Peak A:    0.84 m/s (0.42-0.7 m/s)  E/A Ratio:    1.14     (1.0-2.2)  MV lateral e' 0.04 m/s  MV medial e'  0.03 m/s    MITRAL VALVE:  Normal Ranges:  MV DT: 208 msec (150-240msec)    AORTIC VALVE:  Normal Ranges:  AoV Vmax:      1.39 m/s (<=1.7m/s)  AoV Peak P.7 mmHg (<20mmHg)  LVOT Max Ruben:  0.55 m/s (<=1.1m/s)  LVOT Diameter: 2.00 cm  (1.8-2.4cm)  AoV Area,Vmax: 1.25 cm2 (2.5-4.5cm2)    AORTIC INSUFFICIENCY:  AI Vmax:       3.20 m/s  AI Half-time:  446 msec  AI Decel Rate: 173.75 cm/s2    RIGHT VENTRICLE:  RV 1   2.94 cm  RV 2   1.49 cm  RV 3   8.32 cm  TAPSE: 20.9 mm  RV s'  0.11 m/s    TRICUSPID VALVE/RVSP:  Normal Ranges:  IVC Diam: 1.40 cm      [id number] Rosamaria Seymour MD  Electronically signed on 2022 at 5:16:47 PM              Original Interpreting Physician:   ROSAMARIA SEYMOUR MD  Original Transcribed by/Date: DELORES Dec  5 2022 12:42P  Original Electronically Signed by/Date: ROSAMARIA SEYMOUR MD Dec  5 2022  5:16P    Addendum Interpreting Physician:  Addendum Transcribed by/Date: NO ADDENDUM  Addendum Electronically Signed by/Date:    Ejection Fractions:  No results found for: \"EF\"  Cath:  Coronary Angiography:   ADULT CATH     Narrative  Ordered by an unspecified provider.    Right Heart Cath: No results found for this or any previous visit from the past 1800 days.    Stress Test:  Nuclear:No results found for this or any previous visit from the past 1800 days.    Metabolic Stress: No results found for this or any previous visit from the past 1800 days.    Cardiac Imaging:  Cardiac Scoring: No results found for this or any previous visit from the past 1800 days.    Cardiac MRI: No results found for this or any previous visit from the past 1800 days.         Assessment/Plan  Assessment/Plan   Principal Problem:    Atrial fibrillation " (Multi)    Paroxysmal atrial fib  Ischemic cardiomyopathy  HFrEF  AICD in situ  HTN  CAD    Here for cardioversion with Dr. Cox on 12/4/2024       NP discussed with Dr. Cox regarding plan of care/ discharge plan      I spent 35 minutes in the professional and overall care of this patient.      Carie Little, APRN-CNP

## 2024-12-02 ENCOUNTER — APPOINTMENT (OUTPATIENT)
Dept: CARDIOLOGY | Facility: CLINIC | Age: 79
End: 2024-12-02
Payer: MEDICARE

## 2024-12-02 ENCOUNTER — HOSPITAL ENCOUNTER (OUTPATIENT)
Dept: CARDIOLOGY | Facility: CLINIC | Age: 79
Discharge: HOME | End: 2024-12-02
Payer: MEDICARE

## 2024-12-02 DIAGNOSIS — I48.0 PAF (PAROXYSMAL ATRIAL FIBRILLATION) (MULTI): ICD-10-CM

## 2024-12-02 DIAGNOSIS — Z95.810 PRESENCE OF AUTOMATIC CARDIOVERTER/DEFIBRILLATOR (AICD): ICD-10-CM

## 2024-12-02 DIAGNOSIS — I25.5 ISCHEMIC CARDIOMYOPATHY: ICD-10-CM

## 2024-12-03 ENCOUNTER — ANTICOAGULATION - WARFARIN VISIT (OUTPATIENT)
Dept: CARDIOLOGY | Facility: CLINIC | Age: 79
End: 2024-12-03
Payer: MEDICARE

## 2024-12-03 DIAGNOSIS — I48.20 CHRONIC ATRIAL FIBRILLATION (MULTI): ICD-10-CM

## 2024-12-03 DIAGNOSIS — I48.91 ATRIAL FIBRILLATION, UNSPECIFIED TYPE (MULTI): ICD-10-CM

## 2024-12-03 DIAGNOSIS — Z79.01 LONG TERM (CURRENT) USE OF ANTICOAGULANTS: ICD-10-CM

## 2024-12-03 LAB
POC INR: 2.9
POC PROTHROMBIN TIME: NORMAL

## 2024-12-03 PROCEDURE — 99211 OFF/OP EST MAY X REQ PHY/QHP: CPT | Performed by: INTERNAL MEDICINE

## 2024-12-03 PROCEDURE — 85610 PROTHROMBIN TIME: CPT | Mod: QW

## 2024-12-03 NOTE — PROGRESS NOTES
Location: [facility] - [address] [place] [address] [phone]     Referring Physician: DR STAUFFER  Enrollment/ Re-enrollment date: 10/24   INR Goal: 2.0-3.0  INR monitoring is per [facility] protocol.  Anticoagulation Medication: warfarin  Indication: Atrial Fibrillation/Atrial Flutter    Subjective   Bleeding signs/symptoms: No    Bruising: No   Major bleeding event: No  Thrombosis signs/symptoms: No  Thromboembolic event: No  Missed doses: No  Extra doses: No  Medication changes: No  Dietary changes: No  Change in health: No  Change in activity: No  Alcohol: No  Other concerns: No    Upcoming Surgeries:  Does the Patient Have any upcoming surgeries that require interruption in anticoagulation therapy? no  Does the patient require bridging? no      Anticoagulation Summary  As of 12/3/2024      INR goal:  2.0-3.0   TTR:  78.2% (1.2 y)   INR used for dosin.90 (12/3/2024)   Weekly warfarin total:  39 mg             Assessment/Plan   Therapeutic     1. New dose: no change    2. Next INR: 1 month      Education provided to patient during the visit:  Patient instructed to call in interim with questions, concerns and changes.   Patient educated on interactions between medications and warfarin.   Patient educated on dietary consistency in vitamin k consumption.   Patient educated on affects of alcohol consumption while taking warfarin.   Patient educated on signs of bleeding/clotting.   Patient educated on compliance with dosing, follow up appointments, and prescribed plan of care.

## 2024-12-04 ENCOUNTER — ANESTHESIA EVENT (OUTPATIENT)
Dept: CARDIOLOGY | Facility: HOSPITAL | Age: 79
End: 2024-12-04
Payer: MEDICARE

## 2024-12-04 ENCOUNTER — ANESTHESIA (OUTPATIENT)
Dept: CARDIOLOGY | Facility: HOSPITAL | Age: 79
End: 2024-12-04
Payer: MEDICARE

## 2024-12-04 ENCOUNTER — HOSPITAL ENCOUNTER (OUTPATIENT)
Facility: HOSPITAL | Age: 79
Setting detail: OUTPATIENT SURGERY
Discharge: HOME | End: 2024-12-04
Attending: INTERNAL MEDICINE | Admitting: INTERNAL MEDICINE
Payer: MEDICARE

## 2024-12-04 VITALS
OXYGEN SATURATION: 95 % | HEART RATE: 60 BPM | HEIGHT: 70 IN | DIASTOLIC BLOOD PRESSURE: 77 MMHG | RESPIRATION RATE: 22 BRPM | TEMPERATURE: 97.7 F | WEIGHT: 190 LBS | SYSTOLIC BLOOD PRESSURE: 112 MMHG | BODY MASS INDEX: 27.2 KG/M2

## 2024-12-04 DIAGNOSIS — I48.0 PAROXYSMAL ATRIAL FIBRILLATION (MULTI): Primary | ICD-10-CM

## 2024-12-04 LAB
GLUCOSE BLD MANUAL STRIP-MCNC: 169 MG/DL (ref 74–99)
POCT INTERNATIONAL NORMALIZATION RATIO: 2.6
POCT PROTHROMBIN TIME: NORMAL

## 2024-12-04 PROCEDURE — 2720000007 HC OR 272 NO HCPCS: Performed by: INTERNAL MEDICINE

## 2024-12-04 PROCEDURE — 3700000001 HC GENERAL ANESTHESIA TIME - INITIAL BASE CHARGE: Performed by: INTERNAL MEDICINE

## 2024-12-04 PROCEDURE — 3700000002 HC GENERAL ANESTHESIA TIME - EACH INCREMENTAL 1 MINUTE: Performed by: INTERNAL MEDICINE

## 2024-12-04 PROCEDURE — 7100000009 HC PHASE TWO TIME - INITIAL BASE CHARGE: Performed by: INTERNAL MEDICINE

## 2024-12-04 PROCEDURE — 92960 CARDIOVERSION ELECTRIC EXT: CPT | Performed by: INTERNAL MEDICINE

## 2024-12-04 PROCEDURE — 7100000001 HC RECOVERY ROOM TIME - INITIAL BASE CHARGE: Performed by: INTERNAL MEDICINE

## 2024-12-04 PROCEDURE — 7100000010 HC PHASE TWO TIME - EACH INCREMENTAL 1 MINUTE: Performed by: INTERNAL MEDICINE

## 2024-12-04 PROCEDURE — 7100000002 HC RECOVERY ROOM TIME - EACH INCREMENTAL 1 MINUTE: Performed by: INTERNAL MEDICINE

## 2024-12-04 PROCEDURE — 99222 1ST HOSP IP/OBS MODERATE 55: CPT | Performed by: NURSE PRACTITIONER

## 2024-12-04 PROCEDURE — 2500000005 HC RX 250 GENERAL PHARMACY W/O HCPCS: Performed by: NURSE ANESTHETIST, CERTIFIED REGISTERED

## 2024-12-04 PROCEDURE — 82947 ASSAY GLUCOSE BLOOD QUANT: CPT

## 2024-12-04 PROCEDURE — 2500000004 HC RX 250 GENERAL PHARMACY W/ HCPCS (ALT 636 FOR OP/ED): Performed by: NURSE ANESTHETIST, CERTIFIED REGISTERED

## 2024-12-04 RX ORDER — ETOMIDATE 2 MG/ML
INJECTION INTRAVENOUS AS NEEDED
Status: DISCONTINUED | OUTPATIENT
Start: 2024-12-04 | End: 2024-12-04

## 2024-12-04 RX ORDER — SODIUM CHLORIDE 9 MG/ML
INJECTION, SOLUTION INTRAVENOUS CONTINUOUS PRN
Status: DISCONTINUED | OUTPATIENT
Start: 2024-12-04 | End: 2024-12-04

## 2024-12-04 RX ORDER — PHENYLEPHRINE HCL IN 0.9% NACL 1 MG/10 ML
SYRINGE (ML) INTRAVENOUS AS NEEDED
Status: DISCONTINUED | OUTPATIENT
Start: 2024-12-04 | End: 2024-12-04

## 2024-12-04 SDOH — HEALTH STABILITY: MENTAL HEALTH: CURRENT SMOKER: 0

## 2024-12-04 ASSESSMENT — PAIN SCALES - GENERAL
PAIN_LEVEL: 0
PAINLEVEL_OUTOF10: 0 - NO PAIN

## 2024-12-04 ASSESSMENT — ENCOUNTER SYMPTOMS
SHORTNESS OF BREATH: 1
LIGHT-HEADEDNESS: 0
DIZZINESS: 0
FATIGUE: 1
PALPITATIONS: 1

## 2024-12-04 ASSESSMENT — COLUMBIA-SUICIDE SEVERITY RATING SCALE - C-SSRS
1. IN THE PAST MONTH, HAVE YOU WISHED YOU WERE DEAD OR WISHED YOU COULD GO TO SLEEP AND NOT WAKE UP?: NO
2. HAVE YOU ACTUALLY HAD ANY THOUGHTS OF KILLING YOURSELF?: NO
6. HAVE YOU EVER DONE ANYTHING, STARTED TO DO ANYTHING, OR PREPARED TO DO ANYTHING TO END YOUR LIFE?: NO

## 2024-12-04 ASSESSMENT — PAIN - FUNCTIONAL ASSESSMENT: PAIN_FUNCTIONAL_ASSESSMENT: 0-10

## 2024-12-04 NOTE — ANESTHESIA PREPROCEDURE EVALUATION
Patient: Lj Valenzuela    Procedure Information       Date/Time: 12/04/24 1500    Procedure: Cardioversion - [place] Dual ICD implace    Location: [facility] CATH LAB 2 (EP) / Virtual [facility] Cardiac Cath Lab    Providers: Maurice Cox MD            Relevant Problems   Cardiac   (+) Angina pectoris   (+) Atrial fibrillation (Multi)   (+) Chronic CHF (Multi)   (+) Chronic atrial fibrillation (Multi)   (+) Coronary artery disease   (+) Hypercholesterolemia   (+) Implantable cardioverter-defibrillator (ICD) in situ   (+) Primary hypertension      Neuro   (+) Basal ganglia hemorrhage (Multi)   (+) Bipolar disorder   (+) Cerebrovascular accident (Multi)      Hematology   (+) Long term (current) use of anticoagulants       Clinical information reviewed:                   NPO Detail:  No data recorded     Physical Exam    Airway  Mallampati: II  Neck ROM: full     Cardiovascular    Dental   (+) upper dentures     Pulmonary    Abdominal            Anesthesia Plan    History of general anesthesia?: yes  History of complications of general anesthesia?: no    ASA 3     MAC     The patient is not a current smoker.  Patient was not previously instructed to abstain from smoking on day of procedure.  Patient did not smoke on day of procedure.  Education provided regarding risk of obstructive sleep apnea.  intravenous induction   Anesthetic plan and risks discussed with patient.    Plan discussed with CRNA and CAA.

## 2024-12-04 NOTE — NURSING NOTE
END OF PROCEDURE MONITORING CRITERIA  01. BP is within +/- 20% of preprocedure  02. Oxygen sat is at 92% or above on RA, or existing order for O2 treatment, or at pre-sedation levels, otherwise new O2 order needed.  03. Unless the patient has a pre-procedure history of diminished level of consciousness, s/he is easily arousable and when aroused is able to responds appropriately for his/her age.  04. Significant complications related to the specific procedure are absent, have been controlled, or have been evaluated, including:      A. Pain.      B. Wound drainage.      C. All drains and tubes are patent.      D. Nausea and Vomiting. Vomiting is not persisten and has not occurred within 15 minutes prior to discharge.      E. Bladder distention. Voided bladder and/or no symptoms or urinary retention (e.g., bladder distention, frequent voiding in small amounts).      F. Neurovascular status.      G. Level of Consciousness consistent with pre procedural status.        Transported by wife home.

## 2024-12-04 NOTE — DISCHARGE INSTRUCTIONS
******Please do not miss any doses of your anticoagulant (JANTOVEN). This is very important to prevent the risk of stroke. ******     ******Please call whomever manages your Coumadin (Warfarin) for an INR check in 2-3 days.  ******               Cardioversion           Cardioversion is a non-surgical way to restore your heart's normal rhythm. Medication may be tried first to correct an irregular heartbeat, but if medicines do not work, electrical cardioversion may be needed. The electrical current should make your heartbeat normally again.      After the procedure-    Your heart rate, blood pressure and respirations will be checked frequently by the nurse until you are fully alert.      When the patches are removed form your chest, you may notice redness, irritation, or itching similar to a mild sunburn. You may You may use over the counter hydrocortisone 1% cream and apply up to three times a day for any irritation to your skin on your chest.      Discharge information-   Have an adult with you to get you home from the hospital; you will not be allowed to drive for 24 hours after the procedure.      You may resume your usual routine after discharge.      Make sure you and your family understands how you are to take your medications. The doctor will tell you what to do with your cardiac medicines and your anti-coagulation medicines.      There is a small chance your irregular heartbeat may return. If you feel skipped beats, rapid heart rate, or chest tightness, call your doctor.     You have received sedation today, please follow these guidelines   FOR NEXT 24 HOURS  - Upon discharge, you should return home and rest for the remainder of the day and evening. You do not have to stay on bed rest but should not be very active.  It is recommended a responsible adult be with you for the first 24 hours after the procedure.    -Please resume your blood thinning medication the evening of your procedure.      - No driving  for 24 hours after procedure.     - Do not drive, operate machinery, or use power tools for 24 hours after your procedure.     - Do not make any legal decisions for 24 hours after your procedure.     - Do not drink alcoholic beverages for 24 hours after your procedure.

## 2024-12-04 NOTE — Clinical Note
The ECG shows a sinus rhythm. What Type Of Note Output Would You Prefer (Optional)?: Standard Output Is The Patient Presenting As Previously Scheduled?: Yes How Severe Is Your Rash?: mild Is This A New Presentation, Or A Follow-Up?: Rash Additional History: Patient saw a clinic and was told she had ringworm then was told she had scabies . She is confused

## 2024-12-04 NOTE — POST-PROCEDURE NOTE
Physician Transition of Care Summary  Invasive Cardiovascular Lab    Procedure Date: 12/4/2024  Attending:    * Maurice Cox - Primary  Resident/Fellow/Other Assistant: Surgeons and Role:  * No surgeons found with a matching role *    Indications:   Pre-op Diagnosis      * Paroxysmal atrial fibrillation (Multi) [I48.0]    Post-procedure diagnosis:   Post-op Diagnosis     * Paroxysmal atrial fibrillation (Multi) [I48.0]    Procedure(s):   Cardioversion  00624 - MI CARDIOVERSION ELECTIVE ARRHYTHMIA EXTERNAL        Procedure Findings:   See below    Description of the Procedure:   The patient was brought to the Heart and vascular care area while in atrial fibrillation.  He has been compliant with his medications including his anticoagulation without missing any doses.  Anterior/posterior patches were applied and the patient underwent a synchronized 250 joule biphasic shock restoring sinus rhythm immediately with no complications.    Summary:  Successful cardioversion of atrial fibrillation.     Complications:   none    Stents/Implants:   Implants       No implant documentation for this case.            Anticoagulation/Antiplatelet Plan:   Coumadin    Estimated Blood Loss:   0 mL    Anesthesia: Monitor Anesthesia Care Anesthesia Staff: Anesthesiologist: Kunal Dominguez MD  CRNA: BLAS Martel-CRNA    Any Specimen(s) Removed:   No specimens collected during this procedure.    Disposition:   stable      Electronically signed by: Maurice Cox MD, 12/4/2024 4:11 PM  
No

## 2024-12-04 NOTE — ANESTHESIA POSTPROCEDURE EVALUATION
Patient: Lj Valenzuela    Procedure Summary       Date: 12/04/24 Room / Location: [facility] CATH LAB 2 (EP) / Virtual [facility] Cardiac Cath Lab    Anesthesia Start: 1555 Anesthesia Stop: 1614    Procedure: Cardioversion Diagnosis:       Paroxysmal atrial fibrillation (Multi)      (Paroxysmal atrial fibrillation (Multi) [I48.0])    Providers: Maurice Cox MD Responsible Provider: Kunal Dominguez MD    Anesthesia Type: general ASA Status: 3            Anesthesia Type: general    Vitals Value Taken Time   BP  12/04/24 1720   Temp  12/04/24 1720   Pulse  12/04/24 1720   Resp  12/04/24 1720   SpO2  12/04/24 1720   See [facility] vitals    Anesthesia Post Evaluation    Patient location during evaluation: PACU  Patient participation: complete - patient participated  Level of consciousness: awake  Pain score: 0  Pain management: adequate  Multimodal analgesia pain management approach  Airway patency: patent  Two or more strategies used to mitigate risk of obstructive sleep apnea  Cardiovascular status: acceptable  Respiratory status: acceptable  Hydration status: acceptable  Postoperative Nausea and Vomiting: none        There were no known notable events for this encounter.

## 2024-12-17 ENCOUNTER — HOSPITAL ENCOUNTER (OUTPATIENT)
Dept: CARDIOLOGY | Facility: CLINIC | Age: 79
Discharge: HOME | End: 2024-12-17
Payer: MEDICARE

## 2024-12-17 DIAGNOSIS — Z95.810 PRESENCE OF AUTOMATIC CARDIOVERTER/DEFIBRILLATOR (AICD): ICD-10-CM

## 2024-12-17 DIAGNOSIS — I25.5 ISCHEMIC CARDIOMYOPATHY: ICD-10-CM

## 2024-12-17 DIAGNOSIS — I48.0 PAF (PAROXYSMAL ATRIAL FIBRILLATION) (MULTI): ICD-10-CM

## 2024-12-17 PROCEDURE — 93283 PRGRMG EVAL IMPLANTABLE DFB: CPT | Performed by: INTERNAL MEDICINE

## 2024-12-17 PROCEDURE — 93283 PRGRMG EVAL IMPLANTABLE DFB: CPT

## 2024-12-26 ENCOUNTER — LAB (OUTPATIENT)
Dept: LAB | Facility: LAB | Age: 79
End: 2024-12-26
Payer: MEDICARE

## 2024-12-26 DIAGNOSIS — R69 DIAGNOSIS UNKNOWN: Primary | ICD-10-CM

## 2024-12-27 ENCOUNTER — LAB (OUTPATIENT)
Dept: LAB | Facility: LAB | Age: 79
End: 2024-12-27
Payer: MEDICARE

## 2024-12-27 DIAGNOSIS — N18.30 CHRONIC KIDNEY DISEASE, STAGE 3 UNSPECIFIED (MULTI): ICD-10-CM

## 2024-12-27 DIAGNOSIS — N18.30 CHRONIC KIDNEY DISEASE, STAGE 3 UNSPECIFIED (MULTI): Primary | ICD-10-CM

## 2024-12-27 LAB
ALBUMIN SERPL BCP-MCNC: 4.3 G/DL (ref 3.4–5)
ANION GAP SERPL CALCULATED.3IONS-SCNC: 11 MMOL/L (ref 10–20)
BUN SERPL-MCNC: 43 MG/DL (ref 6–23)
CALCIUM SERPL-MCNC: 9.2 MG/DL (ref 8.6–10.3)
CHLORIDE SERPL-SCNC: 107 MMOL/L (ref 98–107)
CO2 SERPL-SCNC: 29 MMOL/L (ref 21–32)
CREAT SERPL-MCNC: 2.44 MG/DL (ref 0.5–1.3)
EGFRCR SERPLBLD CKD-EPI 2021: 26 ML/MIN/1.73M*2
ERYTHROCYTE [DISTWIDTH] IN BLOOD BY AUTOMATED COUNT: 15.4 % (ref 11.5–14.5)
GLUCOSE SERPL-MCNC: 229 MG/DL (ref 74–99)
HCT VFR BLD AUTO: 39.1 % (ref 41–52)
HGB BLD-MCNC: 11.9 G/DL (ref 13.5–17.5)
MCH RBC QN AUTO: 27.9 PG (ref 26–34)
MCHC RBC AUTO-ENTMCNC: 30.4 G/DL (ref 32–36)
MCV RBC AUTO: 92 FL (ref 80–100)
NRBC BLD-RTO: 0 /100 WBCS (ref 0–0)
PHOSPHATE SERPL-MCNC: 4.5 MG/DL (ref 2.5–4.9)
PLATELET # BLD AUTO: 139 X10*3/UL (ref 150–450)
POTASSIUM SERPL-SCNC: 4.4 MMOL/L (ref 3.5–5.3)
RBC # BLD AUTO: 4.27 X10*6/UL (ref 4.5–5.9)
SODIUM SERPL-SCNC: 143 MMOL/L (ref 136–145)
WBC # BLD AUTO: 5.2 X10*3/UL (ref 4.4–11.3)

## 2024-12-27 PROCEDURE — 85027 COMPLETE CBC AUTOMATED: CPT

## 2024-12-27 PROCEDURE — 80069 RENAL FUNCTION PANEL: CPT

## 2024-12-31 ENCOUNTER — ANTICOAGULATION - WARFARIN VISIT (OUTPATIENT)
Dept: CARDIOLOGY | Facility: CLINIC | Age: 79
End: 2024-12-31
Payer: MEDICARE

## 2024-12-31 DIAGNOSIS — Z79.01 LONG TERM (CURRENT) USE OF ANTICOAGULANTS: ICD-10-CM

## 2024-12-31 DIAGNOSIS — I48.91 ATRIAL FIBRILLATION, UNSPECIFIED TYPE (MULTI): ICD-10-CM

## 2024-12-31 DIAGNOSIS — I48.20 CHRONIC ATRIAL FIBRILLATION (MULTI): ICD-10-CM

## 2024-12-31 LAB
POC INR: 2.3
POC PROTHROMBIN TIME: NORMAL

## 2024-12-31 PROCEDURE — 99211 OFF/OP EST MAY X REQ PHY/QHP: CPT | Performed by: INTERNAL MEDICINE

## 2024-12-31 PROCEDURE — 85610 PROTHROMBIN TIME: CPT | Mod: QW

## 2024-12-31 NOTE — PROGRESS NOTES
Location: [facility] - [address] [place] [address] [phone]     Referring Physician: DR STAUFFER  Enrollment/ Re-enrollment date: 2025  INR Goal: 2.0-3.0  INR monitoring is per [facility] protocol.  Anticoagulation Medication: warfarin  Indication: Atrial Fibrillation/Atrial Flutter    Subjective   Bleeding signs/symptoms: No    Bruising: No   Major bleeding event: No  Thrombosis signs/symptoms: No  Thromboembolic event: No  Missed doses: No  Extra doses: No  Medication changes: No  Dietary changes: No  Change in health: No  Change in activity: No  Alcohol: No  Other concerns: No    Upcoming Surgeries:  Does the Patient Have any upcoming surgeries that require interruption in anticoagulation therapy? no  Does the patient require bridging? no      Anticoagulation Summary  As of 2024      INR goal:  2.0-3.0   TTR:  79.5% (1.3 y)   INR used for dosin.30 (2024)   Weekly warfarin total:  39 mg               Assessment/Plan   Therapeutic     1. New dose: no change    2. Next INR: 1 month      Education provided to patient during the visit:  Patient instructed to call in interim with questions, concerns and changes.   Patient educated on interactions between medications and warfarin.   Patient educated on dietary consistency in vitamin k consumption.   Patient educated on affects of alcohol consumption while taking warfarin.   Patient educated on signs of bleeding/clotting.   Patient educated on compliance with dosing, follow up appointments, and prescribed plan of care.

## 2025-01-06 ENCOUNTER — APPOINTMENT (OUTPATIENT)
Dept: PRIMARY CARE | Facility: CLINIC | Age: 80
End: 2025-01-06
Payer: MEDICARE

## 2025-01-06 VITALS
WEIGHT: 193 LBS | HEART RATE: 60 BPM | OXYGEN SATURATION: 98 % | DIASTOLIC BLOOD PRESSURE: 70 MMHG | TEMPERATURE: 97.3 F | BODY MASS INDEX: 27.69 KG/M2 | RESPIRATION RATE: 18 BRPM | SYSTOLIC BLOOD PRESSURE: 110 MMHG

## 2025-01-06 DIAGNOSIS — H90.3 SENSORINEURAL HEARING LOSS (SNHL) OF BOTH EARS: Primary | ICD-10-CM

## 2025-01-06 PROCEDURE — 1159F MED LIST DOCD IN RCRD: CPT

## 2025-01-06 PROCEDURE — 99213 OFFICE O/P EST LOW 20 MIN: CPT

## 2025-01-06 PROCEDURE — 1126F AMNT PAIN NOTED NONE PRSNT: CPT

## 2025-01-06 PROCEDURE — 3078F DIAST BP <80 MM HG: CPT

## 2025-01-06 PROCEDURE — 1036F TOBACCO NON-USER: CPT

## 2025-01-06 PROCEDURE — 3074F SYST BP LT 130 MM HG: CPT

## 2025-01-06 PROCEDURE — 1160F RVW MEDS BY RX/DR IN RCRD: CPT

## 2025-01-06 RX ORDER — FLUTICASONE PROPIONATE 50 MCG
1 SPRAY, SUSPENSION (ML) NASAL DAILY
Qty: 16 G | Refills: 0 | Status: SHIPPED | OUTPATIENT
Start: 2025-01-06

## 2025-01-06 ASSESSMENT — ENCOUNTER SYMPTOMS
OCCASIONAL FEELINGS OF UNSTEADINESS: 0
LOSS OF SENSATION IN FEET: 0
DEPRESSION: 0

## 2025-01-06 ASSESSMENT — PATIENT HEALTH QUESTIONNAIRE - PHQ9
2. FEELING DOWN, DEPRESSED OR HOPELESS: NOT AT ALL
SUM OF ALL RESPONSES TO PHQ9 QUESTIONS 1 AND 2: 0
1. LITTLE INTEREST OR PLEASURE IN DOING THINGS: NOT AT ALL

## 2025-01-06 ASSESSMENT — PAIN SCALES - GENERAL: PAINLEVEL_OUTOF10: 0-NO PAIN

## 2025-01-06 NOTE — PROGRESS NOTES
Subjective   Patient ID: Lj Valenzuela is a 79 y.o. male who presents for Ear Fullness (Patient is here for B/L ears clogged ).    HPI   Lj is seen for c/o bilateral ear fullness. Reports trouble hearing, L>R. Worse when watching TV. Saw audiologist about 1 year ago. Reports allergies and occasional sinus congestion.     Review of Systems  All other systems have been reviewed and are negative except as noted in the HPI.     Objective   /70 (BP Location: Left arm, Patient Position: Sitting, BP Cuff Size: Large adult)   Pulse 60   Temp 36.3 °C (97.3 °F) (Temporal)   Resp 18   Wt 87.5 kg (193 lb)   SpO2 98%   BMI 27.69 kg/m²     Physical Exam  Vitals and nursing note reviewed.   Constitutional:       General: He is not in acute distress.  HENT:      Right Ear: Tympanic membrane and ear canal normal. There is no impacted cerumen.      Left Ear: Tympanic membrane and ear canal normal. There is no impacted cerumen.      Nose: Nose normal.      Mouth/Throat:      Mouth: Mucous membranes are moist.   Eyes:      Extraocular Movements: Extraocular movements intact.      Conjunctiva/sclera: Conjunctivae normal.   Cardiovascular:      Rate and Rhythm: Normal rate.   Pulmonary:      Effort: Pulmonary effort is normal.   Musculoskeletal:      Cervical back: Neck supple.   Neurological:      General: No focal deficit present.      Mental Status: He is alert.   Psychiatric:         Mood and Affect: Mood normal.       Assessment/Plan   Assessment & Plan  Sensorineural hearing loss (SNHL) of both ears  Chronic. Small amount of cerumen buildup in right ear, TM still well visualized. No cerumen buildup in left ear.   Referral to audiology for further evaluation and management.   May use Flonase nasal spray as needed for ear fullness. Risks and benefits of medication discussed and prescribed.     Orders:    Referral to Audiology; Future    fluticasone (Flonase) 50 mcg/actuation nasal spray; Administer 1 spray into each  nostril once daily. Shake gently. Before first use, prime pump. After use, clean tip and replace cap.

## 2025-01-06 NOTE — PROGRESS NOTES
Patient ID: Lj Valenzuela is a 79 y.o. male who presents for left ear fullness and trouble hearing.     Ear Cerumen Removal    Date/Time: 1/5/2025 8:45 PM    Performed by: Shelly Quinonez PA-C  Authorized by: Shelly Quinonez PA-C

## 2025-01-14 ENCOUNTER — APPOINTMENT (OUTPATIENT)
Dept: CARDIOLOGY | Facility: CLINIC | Age: 80
End: 2025-01-14
Payer: MEDICARE

## 2025-01-14 VITALS
WEIGHT: 190 LBS | BODY MASS INDEX: 27.26 KG/M2 | DIASTOLIC BLOOD PRESSURE: 64 MMHG | OXYGEN SATURATION: 96 % | SYSTOLIC BLOOD PRESSURE: 130 MMHG | HEART RATE: 82 BPM

## 2025-01-14 DIAGNOSIS — I20.9 ANGINA PECTORIS: Primary | ICD-10-CM

## 2025-01-14 DIAGNOSIS — E78.00 HYPERCHOLESTEROLEMIA: ICD-10-CM

## 2025-01-14 DIAGNOSIS — I48.0 PAROXYSMAL ATRIAL FIBRILLATION (MULTI): ICD-10-CM

## 2025-01-14 DIAGNOSIS — I50.22 CHRONIC SYSTOLIC HEART FAILURE: ICD-10-CM

## 2025-01-14 DIAGNOSIS — I50.22 CHRONIC SYSTOLIC CONGESTIVE HEART FAILURE: ICD-10-CM

## 2025-01-14 DIAGNOSIS — I25.5 GENERALIZED ISCHEMIC MYOCARDIAL DYSFUNCTION: ICD-10-CM

## 2025-01-14 DIAGNOSIS — I48.20 CHRONIC ATRIAL FIBRILLATION (MULTI): ICD-10-CM

## 2025-01-14 DIAGNOSIS — I25.10 CORONARY ARTERY DISEASE INVOLVING NATIVE CORONARY ARTERY OF NATIVE HEART WITHOUT ANGINA PECTORIS: ICD-10-CM

## 2025-01-14 DIAGNOSIS — I42.0 DILATED CARDIOMYOPATHY (MULTI): ICD-10-CM

## 2025-01-14 DIAGNOSIS — Z95.5 HISTORY OF CORONARY ARTERY STENT PLACEMENT: ICD-10-CM

## 2025-01-14 PROCEDURE — 3075F SYST BP GE 130 - 139MM HG: CPT | Performed by: INTERNAL MEDICINE

## 2025-01-14 PROCEDURE — 1160F RVW MEDS BY RX/DR IN RCRD: CPT | Performed by: INTERNAL MEDICINE

## 2025-01-14 PROCEDURE — 99214 OFFICE O/P EST MOD 30 MIN: CPT | Performed by: INTERNAL MEDICINE

## 2025-01-14 PROCEDURE — 3078F DIAST BP <80 MM HG: CPT | Performed by: INTERNAL MEDICINE

## 2025-01-14 PROCEDURE — 1159F MED LIST DOCD IN RCRD: CPT | Performed by: INTERNAL MEDICINE

## 2025-01-14 PROCEDURE — 1126F AMNT PAIN NOTED NONE PRSNT: CPT | Performed by: INTERNAL MEDICINE

## 2025-01-14 ASSESSMENT — PAIN SCALES - GENERAL: PAINLEVEL_OUTOF10: 0-NO PAIN

## 2025-01-14 ASSESSMENT — ENCOUNTER SYMPTOMS
OCCASIONAL FEELINGS OF UNSTEADINESS: 0
DEPRESSION: 0
LOSS OF SENSATION IN FEET: 0

## 2025-01-14 NOTE — PROGRESS NOTES
History of present illness:  This is a very pleasant 79-year-old male with history of ischemic cardiomyopathy ejection fraction of 20-25%, status post ICD, NYHA class II-III. patient returns to my office for follow-up. Patient had synchronized cardioversion by Dr. Cox and he felt much better since.  His shortness of breath has improved.  Remains NYHA class III.  Denies having palpitations dizziness lightheadedness syncope or chest pain.      Past Medical History:   Diagnosis Date    AICD (automatic cardioverter/defibrillator) present 05/29/2016    Formatting of this note might be different from the original. Patient reports that this was placed and he does have history of atrial fibrillation.    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 - L sided subcortical hemorrhage. ICH score of 0. - Etiology is hypertensive and coagulopathy. - Repeat CT scan in 6 hours for stability.    Cancer (Multi)     Chronic atrial fibrillation (Multi) 09/06/2023    Chronic systolic heart failure 09/06/2023    CKD (chronic kidney disease) 08/30/2021    Coronary artery disease 05/29/2016    Diabetes mellitus (Multi) 07/18/2018    Essential hypertension 03/24/2023    History of coronary artery stent placement 09/06/2023    Hypertension     Left ventricular thrombus 06/01/2016    Formatting of this note might be different from the original. 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 His smoking use included cigarettes. He has been exposed to tobacco smoke. He has never used smokeless tobacco. He reports current alcohol use. He reports that he does not use drugs.    Family History   Problem Relation Name Age of Onset    Other (accident) Father         Patient's Medications   New Prescriptions    No medications on file   Previous Medications    ATORVASTATIN (LIPITOR) 80 MG TABLET    TAKE ONE TABLET BY MOUTH ONCE DAILY AT BEDTIME    BLOOD SUGAR DIAGNOSTIC (TRUETRACK TEST) STRIP    2 times a day.    CALCITRIOL (ROCALTROL) 0.25 MCG CAPSULE    1 capsule (0.25 mcg).    CARVEDILOL (COREG) 25 MG TABLET    Take 1 tablet (25 mg) by mouth 2 times a day.    CHOLECALCIFEROL (VITAMIN D3) 50 MCG (2000 UT) TABLET        DAPAGLIFLOZIN PROPANEDIOL (FARXIGA) 10 MG    TAKE ONE TABLET BY MOUTH EVERY OTHER DAY    ENTRESTO 49-51 MG TABLET    TAKE ONE TABLET BY MOUTH TWO TIMES A DAY    FLUTICASONE (FLONASE) 50 MCG/ACTUATION NASAL SPRAY    Administer 1 spray into each nostril once daily. Shake gently. Before first use, prime pump. After use, clean tip and replace cap.    JANTOVEN 6 MG TABLET    TAKE ONE TABLET BY MOUTH ONCE DAILY IN THE EVENING    LAMOTRIGINE (LAMICTAL) 200 MG TABLET    Take 1 tablet (200 mg) by mouth once daily in the morning.    TORSEMIDE (DEMADEX) 20 MG TABLET    Take 1 tablet (20 mg) by mouth 2 times a day.   Modified Medications    No medications on file   Discontinued Medications    No medications on file       Objective   Physical Exam  General: Patient in no acute distress   HEENT: Atraumatic normocephalic.  Neck: Supple, jugular venous pressure within normal limit.  No bruits  Lungs: Clear to auscultation bilaterally  Cardiovascular:  Regular rate and rhythm, normal heart sounds, no murmurs rubs or gallops  Abdomen: Soft nontender nondistended.  Normal bowel sounds.  Extremities: Warm to touch, no edema.    Lab Review   Anticoagulation - Warfarin Visit on 12/31/2024   Component Date Value    POC INR 12/31/2024 2.30    Lab on 12/27/2024   Component Date Value    Glucose 12/27/2024 229 (H)     Sodium 12/27/2024 143     Potassium 12/27/2024 4.4     Chloride 12/27/2024 107     Bicarbonate 12/27/2024 29     Anion Gap 12/27/2024 11     Urea Nitrogen 12/27/2024 43 (H)     Creatinine 12/27/2024 2.44 (H)     eGFR 12/27/2024 26 (L)     Calcium 12/27/2024 9.2     Phosphorus 12/27/2024 4.5     Albumin 12/27/2024 4.3     WBC 12/27/2024 5.2     nRBC 12/27/2024 0.0     RBC 12/27/2024 4.27 (L)     Hemoglobin 12/27/2024 11.9 (L)     Hematocrit 12/27/2024 39.1 (L)     MCV 12/27/2024 92     MCH 12/27/2024 27.9     MCHC 12/27/2024 30.4 (L)     RDW 12/27/2024 15.4 (H)     Platelets 12/27/2024 139 (L)    Admission on 12/04/2024, Discharged on 12/04/2024   Component Date Value    POCT INR 12/04/2024 2.6     POCT Glucose 12/04/2024 169 (H)    Anticoagulation - Warfarin Visit on 12/03/2024   Component Date Value    POC INR 12/03/2024 2.90    Lab on 11/26/2024   Component Date Value    Magnesium 11/26/2024 2.71 (H)     Glucose 11/26/2024 197 (H)     Sodium 11/26/2024 143     Potassium 11/26/2024 4.9     Chloride 11/26/2024 107     Bicarbonate 11/26/2024 30     Anion Gap 11/26/2024 11     Urea Nitrogen 11/26/2024 50 (H)     Creatinine 11/26/2024 2.62 (H)     eGFR 11/26/2024 24 (L)     Calcium 11/26/2024 8.7     Phosphorus 11/26/2024 4.3     Albumin 11/26/2024 3.9     Parathyroid Hormone, Int* 11/26/2024 242.9 (H)     Vitamin D, 25-Hydroxy, T* 11/26/2024 64     WBC 11/26/2024 5.0     nRBC 11/26/2024 0.0     RBC 11/26/2024 4.18 (L)     Hemoglobin 11/26/2024 11.9 (L)     Hematocrit 11/26/2024 39.3 (L)     MCV 11/26/2024 94     MCH 11/26/2024 28.5     MCHC 11/26/2024 30.3 (L)      RDW 11/26/2024 15.8 (H)     Platelets 11/26/2024 179         Assessment/Plan   Patient Active Problem List   Diagnosis    Primary hypertension    Chronic atrial fibrillation (Multi)    Bipolar disorder    Cardiomyopathy    Cerebrovascular accident (Multi)    Chronic CHF (Multi)    Chronic systolic heart failure    Chronic kidney disease, stage 3 unspecified (Multi)    Constipation    Coronary artery disease    Diabetes mellitus (Multi)    Hypercholesterolemia    History of coronary artery stent placement    Implantable cardioverter-defibrillator (ICD) in situ    Generalized ischemic myocardial dysfunction    Angina pectoris    Umbilical hernia    Long term (current) use of anticoagulants    Atrial fibrillation (Multi)    Basal ganglia hemorrhage (Multi)    Dysarthria    Left ventricular thrombus    Acute renal failure (CMS-HCC)    Presence of coronary angioplasty implant and graft    Personal history of nicotine dependence    Other specified disorders of kidney and ureter    Neoplasm of uncertain behavior of left kidney    Hypertensive heart and chronic kidney disease with heart failure and stage 1 through stage 4 chronic kidney disease, or unspecified chronic kidney disease    Contusion of finger of right hand, initial encounter    Benign neoplasm of left kidney      This is a very pleasant 79-year-old male with history of ischemic cardiomyopathy ejection fraction of 20-25%, status post ICD, NYHA class II-III. patient returns to my office for follow-up. Patient had synchronized cardioversion by Dr. Cox and he felt much better since.  His shortness of breath has improved.  Remains NYHA class III.  Denies having palpitations dizziness lightheadedness syncope or chest pain.      Blood pressure and heart rate well-controlled.  He is on optimal medical therapy.  Recommend to continue current medications.  Will follow-up in 3 months.  Reviewed patient labs.  Discussed with him lifestyle modification.        Rosamaria Seymour MD

## 2025-01-28 ENCOUNTER — ANTICOAGULATION - WARFARIN VISIT (OUTPATIENT)
Dept: CARDIOLOGY | Facility: CLINIC | Age: 80
End: 2025-01-28
Payer: MEDICARE

## 2025-01-28 DIAGNOSIS — I48.91 ATRIAL FIBRILLATION, UNSPECIFIED TYPE (MULTI): ICD-10-CM

## 2025-01-28 DIAGNOSIS — I48.20 CHRONIC ATRIAL FIBRILLATION (MULTI): ICD-10-CM

## 2025-01-28 DIAGNOSIS — Z79.01 LONG TERM (CURRENT) USE OF ANTICOAGULANTS: ICD-10-CM

## 2025-01-28 LAB
POC INR: 2
POC PROTHROMBIN TIME: NORMAL

## 2025-01-28 PROCEDURE — 85610 PROTHROMBIN TIME: CPT | Mod: QW

## 2025-01-28 PROCEDURE — 99211 OFF/OP EST MAY X REQ PHY/QHP: CPT | Performed by: INTERNAL MEDICINE

## 2025-01-28 NOTE — PROGRESS NOTES
Location: [facility] - [address] [place] [address] [phone]     Referring Physician: DR STAUFFER  Enrollment/ Re-enrollment date: 2025  INR Goal: 2.0-3.0  INR monitoring is per [facility] protocol.  Anticoagulation Medication: warfarin  Indication: Atrial Fibrillation/Atrial Flutter    Subjective   Bleeding signs/symptoms: No    Bruising: No   Major bleeding event: No  Thrombosis signs/symptoms: No  Thromboembolic event: No  Missed doses: No  Extra doses: No  Medication changes: No  Dietary changes: No  Change in health: No  Change in activity: No  Alcohol: No  Other concerns: No    Upcoming Surgeries:  Does the Patient Have any upcoming surgeries that require interruption in anticoagulation therapy? no  Does the patient require bridging? no      Anticoagulation Summary  As of 2025      INR goal:  2.0-3.0   TTR:  80.7% (1.3 y)   INR used for dosin.00 (2025)   Weekly warfarin total:  39 mg               Assessment/Plan   Therapeutic     1. New dose: no change    2. Next INR: 1 month      Education provided to patient during the visit:  Patient instructed to call in interim with questions, concerns and changes.   Patient educated on interactions between medications and warfarin.   Patient educated on dietary consistency in vitamin k consumption.   Patient educated on affects of alcohol consumption while taking warfarin.   Patient educated on signs of bleeding/clotting.   Patient educated on compliance with dosing, follow up appointments, and prescribed plan of care.

## 2025-02-25 ENCOUNTER — ANTICOAGULATION - WARFARIN VISIT (OUTPATIENT)
Dept: CARDIOLOGY | Facility: CLINIC | Age: 80
End: 2025-02-25
Payer: MEDICARE

## 2025-02-25 DIAGNOSIS — Z79.01 LONG TERM (CURRENT) USE OF ANTICOAGULANTS: ICD-10-CM

## 2025-02-25 DIAGNOSIS — I48.20 CHRONIC ATRIAL FIBRILLATION (MULTI): ICD-10-CM

## 2025-02-25 DIAGNOSIS — I48.91 ATRIAL FIBRILLATION, UNSPECIFIED TYPE (MULTI): ICD-10-CM

## 2025-02-25 LAB
POC INR: 2.4
POC PROTHROMBIN TIME: NORMAL

## 2025-02-25 PROCEDURE — 85610 PROTHROMBIN TIME: CPT | Mod: QW

## 2025-02-25 PROCEDURE — 99211 OFF/OP EST MAY X REQ PHY/QHP: CPT | Performed by: INTERNAL MEDICINE

## 2025-02-25 NOTE — PROGRESS NOTES
Location: [facility] - [address] [place] [address] [phone]     Referring Physician: DR STAUFFER  Enrollment/ Re-enrollment date: 2025  INR Goal: 2.0-3.0  INR monitoring is per [facility] protocol.  Anticoagulation Medication: warfarin  Indication: Atrial Fibrillation/Atrial Flutter    Subjective   Bleeding signs/symptoms: No    Bruising: No   Major bleeding event: No  Thrombosis signs/symptoms: No  Thromboembolic event: No  Missed doses: No  Extra doses: No  Medication changes: No  Dietary changes: No  Change in health: No  Change in activity: No  Alcohol: No  Other concerns: No    Upcoming Surgeries:  Does the Patient Have any upcoming surgeries that require interruption in anticoagulation therapy? no  Does the patient require bridging? no      Anticoagulation Summary  As of 2025      INR goal:  2.0-3.0   TTR:  81.7% (1.4 y)   INR used for dosin.40 (2025)   Weekly warfarin total:  39 mg               Assessment/Plan   Therapeutic     1. New dose: no change    2. Next INR: 1 month      Education provided to patient during the visit:  Patient instructed to call in interim with questions, concerns and changes.   Patient educated on interactions between medications and warfarin.   Patient educated on dietary consistency in vitamin k consumption.   Patient educated on affects of alcohol consumption while taking warfarin.   Patient educated on signs of bleeding/clotting.   Patient educated on compliance with dosing, follow up appointments, and prescribed plan of care.

## 2025-02-26 DIAGNOSIS — H90.3 SENSORINEURAL HEARING LOSS (SNHL) OF BOTH EARS: ICD-10-CM

## 2025-02-26 DIAGNOSIS — I50.22 CHRONIC SYSTOLIC HEART FAILURE: ICD-10-CM

## 2025-02-26 DIAGNOSIS — Z95.810 IMPLANTABLE CARDIOVERTER-DEFIBRILLATOR (ICD) IN SITU: ICD-10-CM

## 2025-02-26 DIAGNOSIS — I20.9 ANGINA PECTORIS: ICD-10-CM

## 2025-02-26 DIAGNOSIS — I48.20 CHRONIC ATRIAL FIBRILLATION (MULTI): ICD-10-CM

## 2025-02-26 RX ORDER — FLUTICASONE PROPIONATE 50 MCG
SPRAY, SUSPENSION (ML) NASAL
Qty: 16 G | Refills: 3 | Status: SHIPPED | OUTPATIENT
Start: 2025-02-26

## 2025-02-26 RX ORDER — CARVEDILOL 25 MG/1
25 TABLET ORAL 2 TIMES DAILY
Qty: 180 TABLET | Refills: 3 | Status: SHIPPED | OUTPATIENT
Start: 2025-02-26 | End: 2026-02-21

## 2025-03-24 ENCOUNTER — HOSPITAL ENCOUNTER (OUTPATIENT)
Dept: CARDIOLOGY | Facility: CLINIC | Age: 80
Discharge: HOME | End: 2025-03-24
Payer: MEDICARE

## 2025-03-24 DIAGNOSIS — I48.91 ATRIAL FIBRILLATION, UNSPECIFIED TYPE (MULTI): ICD-10-CM

## 2025-03-24 DIAGNOSIS — I48.20 CHRONIC ATRIAL FIBRILLATION (MULTI): Primary | ICD-10-CM

## 2025-03-24 DIAGNOSIS — Z95.810 PRESENCE OF AUTOMATIC CARDIOVERTER/DEFIBRILLATOR (AICD): ICD-10-CM

## 2025-03-24 DIAGNOSIS — I48.20 CHRONIC ATRIAL FIBRILLATION (MULTI): ICD-10-CM

## 2025-03-24 PROCEDURE — 93296 REM INTERROG EVL PM/IDS: CPT

## 2025-03-24 PROCEDURE — 93295 DEV INTERROG REMOTE 1/2/MLT: CPT | Performed by: INTERNAL MEDICINE

## 2025-03-25 ENCOUNTER — ANTICOAGULATION - WARFARIN VISIT (OUTPATIENT)
Dept: CARDIOLOGY | Facility: CLINIC | Age: 80
End: 2025-03-25
Payer: MEDICARE

## 2025-03-25 DIAGNOSIS — I48.20 CHRONIC ATRIAL FIBRILLATION (MULTI): ICD-10-CM

## 2025-03-25 DIAGNOSIS — I48.91 ATRIAL FIBRILLATION, UNSPECIFIED TYPE (MULTI): ICD-10-CM

## 2025-03-25 DIAGNOSIS — Z79.01 LONG TERM (CURRENT) USE OF ANTICOAGULANTS: ICD-10-CM

## 2025-03-25 LAB
POC INR: 2
POC PROTHROMBIN TIME: NORMAL

## 2025-03-25 PROCEDURE — 99211 OFF/OP EST MAY X REQ PHY/QHP: CPT | Performed by: INTERNAL MEDICINE

## 2025-03-25 PROCEDURE — 85610 PROTHROMBIN TIME: CPT | Mod: QW

## 2025-03-25 NOTE — PROGRESS NOTES
Location: [facility] - [address] [place] [address] [phone]     Referring Physician: DR STAUFFER  Enrollment/ Re-enrollment date: 2025  INR Goal: 2.0-3.0  INR monitoring is per [facility] protocol.  Anticoagulation Medication: warfarin  Indication: Atrial Fibrillation/Atrial Flutter    Subjective   Bleeding signs/symptoms: No    Bruising: No   Major bleeding event: No  Thrombosis signs/symptoms: No  Thromboembolic event: No  Missed doses: No  Extra doses: No  Medication changes: No  Dietary changes: No  Change in health: No  Change in activity: No  Alcohol: No  Other concerns: No    Upcoming Surgeries:  Does the Patient Have any upcoming surgeries that require interruption in anticoagulation therapy? no  Does the patient require bridging? no      Anticoagulation Summary  As of 3/25/2025      INR goal:  2.0-3.0   TTR:  82.7% (1.5 y)   INR used for dosin.00 (3/25/2025)   Weekly warfarin total:  39 mg               Assessment/Plan   Therapeutic     1. New dose: no change    2. Next INR: 1 month    Education provided to patient during the visit:  Patient instructed to call in interim with questions, concerns and changes.   Patient educated on interactions between medications and warfarin.   Patient educated on dietary consistency in vitamin k consumption.   Patient educated on affects of alcohol consumption while taking warfarin.   Patient educated on signs of bleeding/clotting.   Patient educated on compliance with dosing, follow up appointments, and prescribed plan of care.

## 2025-04-03 LAB
ALBUMIN SERPL-MCNC: 4.6 G/DL (ref 3.6–5.1)
ALP SERPL-CCNC: 68 U/L (ref 35–144)
ALT SERPL-CCNC: 15 U/L (ref 9–46)
ANION GAP SERPL CALCULATED.4IONS-SCNC: 12 MMOL/L (CALC) (ref 7–17)
AST SERPL-CCNC: 15 U/L (ref 10–35)
BASOPHILS # BLD AUTO: 38 CELLS/UL (ref 0–200)
BASOPHILS NFR BLD AUTO: 0.6 %
BILIRUB SERPL-MCNC: 1.1 MG/DL (ref 0.2–1.2)
BUN SERPL-MCNC: 47 MG/DL (ref 7–25)
CALCIUM SERPL-MCNC: 9.6 MG/DL (ref 8.6–10.3)
CHLORIDE SERPL-SCNC: 104 MMOL/L (ref 98–110)
CHOLEST SERPL-MCNC: 119 MG/DL
CHOLEST/HDLC SERPL: 4.4 (CALC)
CO2 SERPL-SCNC: 28 MMOL/L (ref 20–32)
CREAT SERPL-MCNC: 2.42 MG/DL (ref 0.7–1.22)
EGFRCR SERPLBLD CKD-EPI 2021: 26 ML/MIN/1.73M2
EOSINOPHIL # BLD AUTO: 141 CELLS/UL (ref 15–500)
EOSINOPHIL NFR BLD AUTO: 2.2 %
ERYTHROCYTE [DISTWIDTH] IN BLOOD BY AUTOMATED COUNT: 14.4 % (ref 11–15)
EST. AVERAGE GLUCOSE BLD GHB EST-MCNC: 206 MG/DL
EST. AVERAGE GLUCOSE BLD GHB EST-SCNC: 11.4 MMOL/L
GLUCOSE SERPL-MCNC: 177 MG/DL (ref 65–99)
HBA1C MFR BLD: 8.8 % OF TOTAL HGB
HCT VFR BLD AUTO: 41.8 % (ref 38.5–50)
HDLC SERPL-MCNC: 27 MG/DL
HGB BLD-MCNC: 13.5 G/DL (ref 13.2–17.1)
LDLC SERPL CALC-MCNC: 67 MG/DL (CALC)
LYMPHOCYTES # BLD AUTO: 550 CELLS/UL (ref 850–3900)
LYMPHOCYTES NFR BLD AUTO: 8.6 %
MCH RBC QN AUTO: 28.6 PG (ref 27–33)
MCHC RBC AUTO-ENTMCNC: 32.3 G/DL (ref 32–36)
MCV RBC AUTO: 88.6 FL (ref 80–100)
MONOCYTES # BLD AUTO: 486 CELLS/UL (ref 200–950)
MONOCYTES NFR BLD AUTO: 7.6 %
NEUTROPHILS # BLD AUTO: 5184 CELLS/UL (ref 1500–7800)
NEUTROPHILS NFR BLD AUTO: 81 %
NONHDLC SERPL-MCNC: 92 MG/DL (CALC)
PLATELET # BLD AUTO: 161 THOUSAND/UL (ref 140–400)
PMV BLD REES-ECKER: 9.7 FL (ref 7.5–12.5)
POTASSIUM SERPL-SCNC: 4.6 MMOL/L (ref 3.5–5.3)
PROT SERPL-MCNC: 6.9 G/DL (ref 6.1–8.1)
PSA SERPL-MCNC: 5.2 NG/ML
RBC # BLD AUTO: 4.72 MILLION/UL (ref 4.2–5.8)
SODIUM SERPL-SCNC: 144 MMOL/L (ref 135–146)
TRIGL SERPL-MCNC: 186 MG/DL
WBC # BLD AUTO: 6.4 THOUSAND/UL (ref 3.8–10.8)

## 2025-04-07 ENCOUNTER — TELEPHONE (OUTPATIENT)
Dept: PRIMARY CARE | Facility: CLINIC | Age: 80
End: 2025-04-07

## 2025-04-07 ENCOUNTER — APPOINTMENT (OUTPATIENT)
Dept: PRIMARY CARE | Facility: CLINIC | Age: 80
End: 2025-04-07
Payer: MEDICARE

## 2025-04-07 VITALS
OXYGEN SATURATION: 95 % | RESPIRATION RATE: 18 BRPM | DIASTOLIC BLOOD PRESSURE: 60 MMHG | BODY MASS INDEX: 28.34 KG/M2 | HEART RATE: 59 BPM | SYSTOLIC BLOOD PRESSURE: 108 MMHG | HEIGHT: 68 IN | WEIGHT: 187 LBS

## 2025-04-07 DIAGNOSIS — E11.9 TYPE 2 DIABETES MELLITUS WITHOUT COMPLICATION, WITHOUT LONG-TERM CURRENT USE OF INSULIN: ICD-10-CM

## 2025-04-07 DIAGNOSIS — I10 PRIMARY HYPERTENSION: ICD-10-CM

## 2025-04-07 DIAGNOSIS — I25.5 ISCHEMIC CARDIOMYOPATHY: ICD-10-CM

## 2025-04-07 DIAGNOSIS — E78.00 HYPERCHOLESTEROLEMIA: ICD-10-CM

## 2025-04-07 DIAGNOSIS — I48.20 CHRONIC ATRIAL FIBRILLATION (MULTI): ICD-10-CM

## 2025-04-07 DIAGNOSIS — Z00.00 WELL ADULT EXAM: ICD-10-CM

## 2025-04-07 DIAGNOSIS — F31.60 BIPOLAR AFFECTIVE DISORDER, CURRENT EPISODE MIXED, CURRENT EPISODE SEVERITY UNSPECIFIED (MULTI): Primary | ICD-10-CM

## 2025-04-07 PROCEDURE — 99213 OFFICE O/P EST LOW 20 MIN: CPT | Performed by: FAMILY MEDICINE

## 2025-04-07 PROCEDURE — 3074F SYST BP LT 130 MM HG: CPT | Performed by: FAMILY MEDICINE

## 2025-04-07 PROCEDURE — 3078F DIAST BP <80 MM HG: CPT | Performed by: FAMILY MEDICINE

## 2025-04-07 PROCEDURE — 1158F ADVNC CARE PLAN TLK DOCD: CPT | Performed by: FAMILY MEDICINE

## 2025-04-07 PROCEDURE — G0439 PPPS, SUBSEQ VISIT: HCPCS | Performed by: FAMILY MEDICINE

## 2025-04-07 PROCEDURE — 1036F TOBACCO NON-USER: CPT | Performed by: FAMILY MEDICINE

## 2025-04-07 PROCEDURE — 1126F AMNT PAIN NOTED NONE PRSNT: CPT | Performed by: FAMILY MEDICINE

## 2025-04-07 PROCEDURE — 1170F FXNL STATUS ASSESSED: CPT | Performed by: FAMILY MEDICINE

## 2025-04-07 PROCEDURE — 1159F MED LIST DOCD IN RCRD: CPT | Performed by: FAMILY MEDICINE

## 2025-04-07 PROCEDURE — 1123F ACP DISCUSS/DSCN MKR DOCD: CPT | Performed by: FAMILY MEDICINE

## 2025-04-07 ASSESSMENT — ACTIVITIES OF DAILY LIVING (ADL)
MANAGING_FINANCES: INDEPENDENT
TAKING_MEDICATION: INDEPENDENT
DRESSING: INDEPENDENT
GROCERY_SHOPPING: INDEPENDENT
DOING_HOUSEWORK: INDEPENDENT
BATHING: INDEPENDENT

## 2025-04-07 ASSESSMENT — ENCOUNTER SYMPTOMS
OCCASIONAL FEELINGS OF UNSTEADINESS: 1
DEPRESSION: 0
LOSS OF SENSATION IN FEET: 0

## 2025-04-07 ASSESSMENT — PAIN SCALES - GENERAL: PAINLEVEL_OUTOF10: 0-NO PAIN

## 2025-04-07 NOTE — ASSESSMENT & PLAN NOTE
Continue carvedilol torsemide and Entresto through his cardiologist.  Follow-up with cardiologist.  Recheck with me in 6 months.

## 2025-04-07 NOTE — ASSESSMENT & PLAN NOTE
We long talk regarding his elevated sugars.  He is continues to follow poor diet and does not check his sugars.  He is on Farxiga but no other medication.  His creatinine is elevated so need to avoid many other diabetic medications.  Will refer to [place]ena our diabetic educator to discuss diet, glucose monitoring, and initiation of a small amount of long-acting insulin to carefully bring his sugars down.

## 2025-04-07 NOTE — PROGRESS NOTES
"Subjective   Reason for Visit: Lj Valenzuela is an 80 y.o. male here for a Medicare Wellness visit.     Past Medical, Surgical, and Family History reviewed and updated in chart.    Reviewed all medications by prescribing practitioner or clinical pharmacist (such as prescriptions, OTCs, herbal therapies and supplements) and documented in the medical record.    HPI    Patient Care Team:  Kunal Rosales MD as PCP - General  Rosamaria Seymour MD as PCP - [facility] Attributed Provider  MD Kunal Westfall MD as Primary Care Provider   Colonoscopy in 3/17 by Dr. Escobar showed diverticula and a small polyp. Recommended to repeat in 10 years.     2. LJ is seen today for bipolar disorder.  He is on Lamotrigine. He is followed by Dr. Ruiz.     3. LJ is seen also for follow up of Diabetes 2.   He is on Farxiga (started 10/23). Recent A1C is 8.8.  He does not monitor his blood sugars.  He follows up for diet with large amount of carbohydrates.     4. LJ is seen today also for follow up of High cholesterol.  He is on atorvastatin 80 mg. Recent LDL is 67.     5. LJ is here also for follow up of CHF and essential hypertension.  He is on  warfarin, carvedilol, torsemide and entresto  through Dr. Whitaker . He saw Dr. Cox (Defibrillator placed in 11/14).      6. He is also here for follow-up for chronic kidney disease.  He has been following with Dr. Vidales. Recent GFR is 26 which is stable for him    Review of Systems  Denies headache, blurred vision, chest pain, shortness of breath, nausea or vomiting, change in bowel habits or leg pain or swelling.    Objective   Vitals:  /60 (BP Location: Left arm, Patient Position: Sitting, BP Cuff Size: Large adult)   Pulse 59   Resp 18   Ht 1.727 m (5' 8\")   Wt 84.8 kg (187 lb)   SpO2 95%   BMI 28.43 kg/m²       Physical Exam  General appearance: Vital signs have been reviewed.  Comfortable.  Well-nourished and well-developed.He is alert and " oriented x3 and appears his stated age.The patient is cooperative with exam.  Head: Hair pattern reveals a normal pattern for patient's age and face shows no abnormalities.  Eyes: PERRLA x2, EOMI x2, conjunctive a and sclera are clear.  Ears: External bilateral ears with normal helix, tragus and earlobe.Bilateral canals are normal.Bilateral tympanic membranes are pearly gray and landmarks are well visualized.  Nose: Nasal mucosa both nostrils reveals no polyps, ulcerations or lesions.  Throat:Teeth are in good repair.  Posterior pharynx reveals no abnormalities.  Neck: Supple without lymphadenopathy, thyromegaly or carotid bruits.  Lungs:Clear to auscultation bilaterally with no wheezes, rales or rhonchi.  Cardiovascular: RRR without MRG.No carotid bruits.  Extremities without edema and pulses are intact.  Abdomen: Soft, NT, no masses, no hepatosplenomegaly.  Genitalia: No testicular masses.  No evidence of inguinal hernia.Nontender.  Rectal: No masses.  Prostate is normal in size and shape with no nodules. It is nontender.  Musculoskeletal: 5/5 and equal strength in bilateral upper and lower extremities.  Skin: Good turgor and without rashes.  Neurological: Good overall strength and no focal deficits.  Cranial nerves II through XII are grossly intact.  Psychiatric: Patient has appropriate judgment with good insight.  Mood is appropriate.    Assessment & Plan  Bipolar affective disorder, current episode mixed, current episode severity unspecified (Multi)  Continue current medications and following with his psychiatrist.  Recheck with me in 6 months.         Ischemic cardiomyopathy  Continue carvedilol, torsemide and Entresto through Dr. Seymour.  Continue following cardiologist and follow-up with me in 6 months.         Chronic atrial fibrillation (Multi)  Continue current medication and following with his cardiologist.  Check with me in 6 months.         Type 2 diabetes mellitus without complication, without  long-term current use of insulin  We long talk regarding his elevated sugars.  He is continues to follow poor diet and does not check his sugars.  He is on Farxiga but no other medication.  His creatinine is elevated so need to avoid many other diabetic medications.  Will refer to Helena our diabetic educator to discuss diet, glucose monitoring, and initiation of a small amount of long-acting insulin to carefully bring his sugars down.         Hypercholesterolemia  Continue atorvastatin.  Improve diet.  Recheck in 6 months.         Primary hypertension  Continue carvedilol torsemide and Entresto through his cardiologist.  Follow-up with cardiologist.  Recheck with me in 6 months.         Well adult exam  Anticipatory guidance given.

## 2025-04-07 NOTE — ASSESSMENT & PLAN NOTE
Continue current medication and following with his cardiologist.  Check with me in 6 months.

## 2025-04-07 NOTE — ASSESSMENT & PLAN NOTE
Continue current medications and following with his psychiatrist.  Recheck with me in 6 months.

## 2025-04-07 NOTE — ASSESSMENT & PLAN NOTE
Continue carvedilol, torsemide and Entresto through Dr. Seymour.  Continue following cardiologist and follow-up with me in 6 months.

## 2025-04-08 ENCOUNTER — APPOINTMENT (OUTPATIENT)
Dept: CARDIOLOGY | Facility: CLINIC | Age: 80
End: 2025-04-08
Payer: MEDICARE

## 2025-04-08 ENCOUNTER — OFFICE VISIT (OUTPATIENT)
Dept: CARDIOLOGY | Facility: CLINIC | Age: 80
End: 2025-04-08
Payer: MEDICARE

## 2025-04-08 VITALS
RESPIRATION RATE: 16 BRPM | SYSTOLIC BLOOD PRESSURE: 112 MMHG | DIASTOLIC BLOOD PRESSURE: 68 MMHG | WEIGHT: 193 LBS | BODY MASS INDEX: 29.35 KG/M2 | HEART RATE: 60 BPM | OXYGEN SATURATION: 97 %

## 2025-04-08 DIAGNOSIS — I25.10 CORONARY ARTERY DISEASE INVOLVING NATIVE CORONARY ARTERY OF NATIVE HEART WITHOUT ANGINA PECTORIS: ICD-10-CM

## 2025-04-08 DIAGNOSIS — I13.0 HYPERTENSIVE HEART AND CHRONIC KIDNEY DISEASE WITH HEART FAILURE AND STAGE 1 THROUGH STAGE 4 CHRONIC KIDNEY DISEASE, OR UNSPECIFIED CHRONIC KIDNEY DISEASE: ICD-10-CM

## 2025-04-08 DIAGNOSIS — I48.0 PAROXYSMAL ATRIAL FIBRILLATION (MULTI): ICD-10-CM

## 2025-04-08 DIAGNOSIS — I20.9 ANGINA PECTORIS: Primary | ICD-10-CM

## 2025-04-08 DIAGNOSIS — I42.0 DILATED CARDIOMYOPATHY (MULTI): ICD-10-CM

## 2025-04-08 DIAGNOSIS — I48.20 CHRONIC ATRIAL FIBRILLATION (MULTI): ICD-10-CM

## 2025-04-08 PROCEDURE — 1123F ACP DISCUSS/DSCN MKR DOCD: CPT | Performed by: INTERNAL MEDICINE

## 2025-04-08 PROCEDURE — 1159F MED LIST DOCD IN RCRD: CPT | Performed by: INTERNAL MEDICINE

## 2025-04-08 PROCEDURE — 99214 OFFICE O/P EST MOD 30 MIN: CPT | Performed by: INTERNAL MEDICINE

## 2025-04-08 PROCEDURE — 3078F DIAST BP <80 MM HG: CPT | Performed by: INTERNAL MEDICINE

## 2025-04-08 PROCEDURE — 1126F AMNT PAIN NOTED NONE PRSNT: CPT | Performed by: INTERNAL MEDICINE

## 2025-04-08 PROCEDURE — 1160F RVW MEDS BY RX/DR IN RCRD: CPT | Performed by: INTERNAL MEDICINE

## 2025-04-08 PROCEDURE — 3074F SYST BP LT 130 MM HG: CPT | Performed by: INTERNAL MEDICINE

## 2025-04-08 PROCEDURE — 1036F TOBACCO NON-USER: CPT | Performed by: INTERNAL MEDICINE

## 2025-04-08 ASSESSMENT — LIFESTYLE VARIABLES
AUDIT-C TOTAL SCORE: 0
SKIP TO QUESTIONS 9-10: 1
HAS A RELATIVE, FRIEND, DOCTOR, OR ANOTHER HEALTH PROFESSIONAL EXPRESSED CONCERN ABOUT YOUR DRINKING OR SUGGESTED YOU CUT DOWN: NO
HOW OFTEN DO YOU HAVE SIX OR MORE DRINKS ON ONE OCCASION: NEVER
HOW MANY STANDARD DRINKS CONTAINING ALCOHOL DO YOU HAVE ON A TYPICAL DAY: PATIENT DOES NOT DRINK
HOW OFTEN DO YOU HAVE A DRINK CONTAINING ALCOHOL: NEVER
AUDIT TOTAL SCORE: 0
HAVE YOU OR SOMEONE ELSE BEEN INJURED AS A RESULT OF YOUR DRINKING: NO

## 2025-04-08 ASSESSMENT — ENCOUNTER SYMPTOMS
DEPRESSION: 0
OCCASIONAL FEELINGS OF UNSTEADINESS: 0
LOSS OF SENSATION IN FEET: 0

## 2025-04-08 ASSESSMENT — PAIN SCALES - GENERAL: PAINLEVEL_OUTOF10: 0-NO PAIN

## 2025-04-08 NOTE — PROGRESS NOTES
History of present illness:  This is a very pleasant 79-year-old male with history of ischemic cardiomyopathy ejection fraction of 20-25%, status post ICD, NYHA class II-III. patient returns to my office for follow-up.  Patient doing overall okay.  NYHA class II-III.  Stable remains in sinus rhythm after his cardioversion by Dr. Cox.  Patient denies having chest pain palpitations dizziness lightheadedness or syncope.  Still has mild lower extremity edema.    Past Medical History:   Diagnosis Date    AICD (automatic cardioverter/defibrillator) present 05/29/2016    Formatting of this note might be different from the original. Patient reports that this was placed and he does have history of atrial fibrillation.    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 - L sided subcortical hemorrhage. ICH score of 0. - Etiology is hypertensive and coagulopathy. - Repeat CT scan in 6 hours for stability.    Cancer (Multi)     Chronic atrial fibrillation (Multi) 09/06/2023    Chronic systolic heart failure 09/06/2023    CKD (chronic kidney disease) 08/30/2021    Coronary artery disease 05/29/2016    Diabetes mellitus (Multi) 07/18/2018    Essential hypertension 03/24/2023    History of coronary artery stent placement 09/06/2023    Hypertension     Left ventricular thrombus 06/01/2016    Formatting of this note might be different from the original. 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 His smoking use included cigarettes. He has been exposed to tobacco smoke. He has never used smokeless tobacco. He reports that he does not currently use alcohol. He reports that he does not use drugs.    Family History   Problem Relation Name Age of Onset    Other (accident) Father         Patient's Medications   New Prescriptions    No medications on file   Previous Medications    ATORVASTATIN (LIPITOR) 80 MG TABLET    TAKE ONE TABLET BY MOUTH ONCE DAILY AT BEDTIME    BLOOD SUGAR DIAGNOSTIC (TRUETRACK TEST) STRIP    2 times a day.    CALCITRIOL (ROCALTROL) 0.25 MCG CAPSULE    1 capsule (0.25 mcg).    CARVEDILOL (COREG) 25 MG TABLET    Take 1 tablet (25 mg) by mouth 2 times a day.    CHOLECALCIFEROL (VITAMIN D3) 50 MCG (2000 UT) TABLET        DAPAGLIFLOZIN PROPANEDIOL (FARXIGA) 10 MG    TAKE ONE TABLET BY MOUTH EVERY OTHER DAY    ENTRESTO 49-51 MG TABLET    TAKE ONE TABLET BY MOUTH TWO TIMES A DAY    JANTOVEN 6 MG TABLET    TAKE ONE TABLET BY MOUTH ONCE DAILY IN THE EVENING    LAMOTRIGINE (LAMICTAL) 200 MG TABLET    Take 1 tablet (200 mg) by mouth once daily in the morning.    TORSEMIDE (DEMADEX) 20 MG TABLET    Take 1 tablet (20 mg) by mouth 2 times a day.   Modified Medications    No medications on file   Discontinued Medications    No medications on file       Objective   Physical Exam  General: Patient in no acute distress   HEENT: Atraumatic normocephalic.  Neck: Supple, jugular venous pressure within normal limit.  No bruits  Lungs: Clear to auscultation bilaterally  Cardiovascular: Regular rate and rhythm, normal heart sounds, no murmurs rubs or gallops  Abdomen: Soft nontender nondistended.  Normal bowel sounds.  Extremities: Warm  to touch, no edema.    Lab Review   Orders Only on 04/02/2025   Component Date Value    CHOLESTEROL, TOTAL 04/02/2025 119     HDL CHOLESTEROL 04/02/2025 27 (L)     TRIGLYCERIDES 04/02/2025 186 (H)     LDL-CHOLESTEROL 04/02/2025 67     CHOL/HDLC RATIO 04/02/2025 4.4     NON HDL CHOLESTEROL 04/02/2025 92     GLUCOSE 04/02/2025 177 (H)     UREA NITROGEN (BUN) 04/02/2025 47 (H)     CREATININE 04/02/2025 2.42 (H)     EGFR 04/02/2025 26 (L)     SODIUM 04/02/2025 144     POTASSIUM 04/02/2025 4.6     CHLORIDE 04/02/2025 104     CARBON DIOXIDE 04/02/2025 28     ELECTROLYTE BALANCE 04/02/2025 12     CALCIUM 04/02/2025 9.6     PROTEIN, TOTAL 04/02/2025 6.9     ALBUMIN 04/02/2025 4.6     BILIRUBIN, TOTAL 04/02/2025 1.1     ALKALINE PHOSPHATASE 04/02/2025 68     AST 04/02/2025 15     ALT 04/02/2025 15     WHITE BLOOD CELL COUNT 04/02/2025 6.4     RED BLOOD CELL COUNT 04/02/2025 4.72     HEMOGLOBIN 04/02/2025 13.5     HEMATOCRIT 04/02/2025 41.8     MCV 04/02/2025 88.6     MCH 04/02/2025 28.6     MCHC 04/02/2025 32.3     RDW 04/02/2025 14.4     PLATELET COUNT 04/02/2025 161     MPV 04/02/2025 9.7     ABSOLUTE NEUTROPHILS 04/02/2025 5,184     ABSOLUTE LYMPHOCYTES 04/02/2025 550 (L)     ABSOLUTE MONOCYTES 04/02/2025 486     ABSOLUTE EOSINOPHILS 04/02/2025 141     ABSOLUTE BASOPHILS 04/02/2025 38     NEUTROPHILS 04/02/2025 81     LYMPHOCYTES 04/02/2025 8.6     MONOCYTES 04/02/2025 7.6     EOSINOPHILS 04/02/2025 2.2     BASOPHILS 04/02/2025 0.6     PSA, TOTAL 04/02/2025 5.20 (H)     HEMOGLOBIN A1c 04/02/2025 8.8 (H)     eAG (mg/dL) 04/02/2025 206     eAG (mmol/L) 04/02/2025 11.4    Anticoagulation - Warfarin Visit on 03/25/2025   Component Date Value    POC INR 03/25/2025 2.00    Anticoagulation - Warfarin Visit on 02/25/2025   Component Date Value    POC INR 02/25/2025 2.40         Assessment/Plan   Patient Active Problem List   Diagnosis    Primary hypertension    Chronic atrial fibrillation (Multi)    Bipolar disorder     Cardiomyopathy    Cerebrovascular accident (Multi)    Chronic CHF (Multi)    Chronic systolic heart failure    Chronic kidney disease, stage 3 unspecified (Multi)    Constipation    Coronary artery disease    Diabetes mellitus (Multi)    Hypercholesterolemia    History of coronary artery stent placement    Implantable cardioverter-defibrillator (ICD) in situ    Generalized ischemic myocardial dysfunction    Angina pectoris    Umbilical hernia    Long term (current) use of anticoagulants    Atrial fibrillation (Multi)    Basal ganglia hemorrhage (Multi)    Dysarthria    Left ventricular thrombus    Acute renal failure    Presence of coronary angioplasty implant and graft    Personal history of nicotine dependence    Other specified disorders of kidney and ureter    Neoplasm of uncertain behavior of left kidney    Hypertensive heart and chronic kidney disease with heart failure and stage 1 through stage 4 chronic kidney disease, or unspecified chronic kidney disease    Contusion of finger of right hand, initial encounter    Benign neoplasm of left kidney    Well adult exam        This is a very pleasant 79-year-old male with history of ischemic cardiomyopathy ejection fraction of 20-25%, status post ICD, NYHA class II-III. patient returns to my office for follow-up.  Patient doing overall okay.  NYHA class II-III.  Stable remains in sinus rhythm after his cardioversion by Dr. Cox.  Patient denies having chest pain palpitations dizziness lightheadedness or syncope.  Still has mild lower extremity edema.    Reviewed patient labs his BUN slightly elevated as well as his sodium will decrease his torsemide to 1 tablet daily and if he starts retaining more fluids go back to twice daily.  Otherwise stable from my standpoint continue current medications he is on optimal medical therapy not candidate for advanced therapy with his age but has been relatively stable will monitor.    Rosamaria Seymour MD

## 2025-04-08 NOTE — PATIENT INSTRUCTIONS
Decrease torsemide to 1 tablet a day if you start having a lot of swelling in your legs go back to twice a day.  Otherwise continue current medications I will see you in 4

## 2025-04-21 ENCOUNTER — ANTICOAGULATION - WARFARIN VISIT (OUTPATIENT)
Dept: CARDIOLOGY | Facility: CLINIC | Age: 80
End: 2025-04-21
Payer: MEDICARE

## 2025-04-21 DIAGNOSIS — Z79.01 LONG TERM (CURRENT) USE OF ANTICOAGULANTS: ICD-10-CM

## 2025-04-21 DIAGNOSIS — I48.91 ATRIAL FIBRILLATION, UNSPECIFIED TYPE (MULTI): ICD-10-CM

## 2025-04-21 DIAGNOSIS — I48.20 CHRONIC ATRIAL FIBRILLATION (MULTI): ICD-10-CM

## 2025-04-21 LAB
POC INR: 2 (ref 0.9–1.1)
POC PROTHROMBIN TIME: ABNORMAL (ref 9.3–12.5)

## 2025-04-21 PROCEDURE — 85610 PROTHROMBIN TIME: CPT | Mod: QW

## 2025-04-21 PROCEDURE — 99211 OFF/OP EST MAY X REQ PHY/QHP: CPT | Performed by: INTERNAL MEDICINE

## 2025-04-21 NOTE — PROGRESS NOTES
[facility] [address] [place] [address] [phone]     Referring Physician: DR STAUFFER  Enrollment/ Re-enrollment date: 2025  INR Goal: 2.0-3.0  INR monitoring is per [facility] protocol.  Anticoagulation Medication: warfarin  Indication: Atrial Fibrillation/Atrial Flutter    Subjective   Bleeding signs/symptoms: No    Bruising: No   Major bleeding event: No  Thrombosis signs/symptoms: No  Thromboembolic event: No  Missed doses: No  Extra doses: No  Medication changes: No  Dietary changes: No  Change in health: No  Change in activity: No  Alcohol: No  Other concerns: No    Upcoming Surgeries:  Does the Patient Have any upcoming surgeries that require interruption in anticoagulation therapy? no  Does the patient require bridging? no      Anticoagulation Summary  As of 2025      INR goal:  2.0-3.0   TTR:  83.5% (1.6 y)   INR used for dosin.00 (2025)   Weekly warfarin total:  39 mg               Assessment/Plan   Therapeutic     1. New dose: no change    2. Next INR: 1 month    Education provided to patient during the visit:  Patient instructed to call in interim with questions, concerns and changes.   Patient educated on interactions between medications and warfarin.   Patient educated on dietary consistency in vitamin k consumption.   Patient educated on affects of alcohol consumption while taking warfarin.   Patient educated on signs of bleeding/clotting.   Patient educated on compliance with dosing, follow up appointments, and prescribed plan of care.

## 2025-04-22 DIAGNOSIS — E11.9 TYPE 2 DIABETES MELLITUS WITHOUT COMPLICATION, WITHOUT LONG-TERM CURRENT USE OF INSULIN: ICD-10-CM

## 2025-04-27 DIAGNOSIS — E11.9 TYPE 2 DIABETES MELLITUS WITHOUT COMPLICATION, WITHOUT LONG-TERM CURRENT USE OF INSULIN: Primary | ICD-10-CM

## 2025-04-28 ENCOUNTER — APPOINTMENT (OUTPATIENT)
Dept: PRIMARY CARE | Facility: CLINIC | Age: 80
End: 2025-04-28
Payer: MEDICARE

## 2025-04-28 DIAGNOSIS — E11.65 POORLY CONTROLLED DIABETES MELLITUS (MULTI): Primary | ICD-10-CM

## 2025-04-28 NOTE — PROGRESS NOTES
CGM Personal Start     Lj Valenzuela presents to the office for his CGM personal start education and training. Referring Provider: Kunla Rosales MD    Pt here today for CGM start.  He is accompanied to this visit by his wife.      HEMOGLOBIN A1c (% of total Hgb)   Date Value   04/02/2025 8.8 (H)     Hemoglobin A1C (%)   Date Value   07/23/2024 8.0 (H)   01/30/2024 7.4 (H)   10/17/2023 8.1 (H)     GLUCOSE (mg/dL)   Date Value   04/02/2025 177 (H)     Glucose (mg/dL)   Date Value   12/27/2024 229 (H)   11/26/2024 197 (H)   07/23/2024 173 (H)     Creatinine (mg/dL)   Date Value   12/27/2024 2.44 (H)   11/26/2024 2.62 (H)   07/23/2024 2.60 (H)     CREATININE (mg/dL)   Date Value   04/02/2025 2.42 (H)     EGFR (mL/min/1.73m2)   Date Value   04/02/2025 26 (L)     eGFR (mL/min/1.73m*2)   Date Value   12/27/2024 26 (L)   11/26/2024 24 (L)   07/23/2024 24 (L)       CURRENT PHARMACOTHERAPY  Medication Reconciliation completed with patient in office today   Medications Ordered Prior to Encounter[1]      SMBG:  Testing at home? No    CGM:  Device started: ISABEL 3    Hypoglycemia tx/sx/prevention:   denies hypoglycemia  Reviewed sx and treatment  Advised to carry glucose source at all times    Assessment/Plan:  Patient received the following information for Dexcom personal start today:  Installed Dexcom and Clarity apps. Patient applied sensor (must be changed in 10 days). Informed re: expected differences in sensor glucose and blood glucose; check with fingerstick if symptoms do not match sensor glucose or if magnifying glass appears on display. Trend arrows. Products for better adhesion: skintac and provided overpatch. Suha functions: target ranges set to ; alerts set if applicable; adding events (carbs, insulin, exercise). Remove for MRI, CT scan and Xray. Call Dexcom for problems with sensor, they will replace. Patient correctly applied sensor.     Patient will benefit from insight using CGM and learning which meals  or snacks cause high sugars. We will also be able to make necessary adjustments to the patient’s insulin/medication regimen based on CGM data at next follow-up. Patient has been using a blood glucose monitor and performing frequent testing. Patient is treated with daily insulin injections and the treatment regimen requires frequent adjustment based on blood sugars. CGM is medically necessary. It josefina allow us to remotely monitor patient's blood sugar and make necessary adjustments. Patient is adherent to diabetic treatment plan and participates in ongoing education and support. He/She is also motivated and knowledgeable about the use of CGM.      Plan:  Check francesco often - when you wake up, before/after meals, bedtime, etc.  Follow francesco closely, learning from glucose readings which meals/snacks cause higher blood sugars.  Call [employer] Support at [phone] for any problems with sensor readings or adhesion.  Follow up with Clinical Pharmacist as scheduled.      [employer] personal start training provided by HUEY Carrero [facility], [facility]   Patient will benefit from insight using CGM and learning which meals or snacks cause high sugars. We will also be able to make necessary adjustments to the patient’s insulin/medication regimen based on CGM data at next follow-up. Patient has been using a blood glucose monitor and performing frequent testing. Patient is treated with daily insulin injections and the treatment regimen requires frequent adjustment based on blood sugars. CGM is medically necessary. It josefina allow us to remotely monitor patient's blood sugar and make necessary adjustments. Patient is adherent to diabetic treatment plan and participates in ongoing education and support. He/She is also motivated and knowledgeable about the use of CGM.      Plan:  1.  Check francesco often - when you wake up, before/after meals, bedtime, etc.  2.  Follow francesco closely, learning from glucose readings which meals/snacks cause higher blood  sugars.  3.  Call Dexcom Support at [phone] for any problems with sensor readings or adhesion.  4.  Follow up with Clinical Pharmacist as scheduled.      [employer] personal start training provided by HUEY Carrero [facility], [place]        [1]   Current Outpatient Medications on File Prior to Visit   Medication Sig Dispense Refill    atorvastatin (Lipitor) 80 mg tablet TAKE ONE TABLET BY MOUTH ONCE DAILY AT BEDTIME 90 tablet 3    blood sugar diagnostic (Truetrack Test) strip 2 times a day.      calcitriol (Rocaltrol) 0.25 mcg capsule 1 capsule (0.25 mcg).      carvedilol (Coreg) 25 mg tablet Take 1 tablet (25 mg) by mouth 2 times a day. 180 tablet 3    cholecalciferol (Vitamin D3) 50 MCG (2000 UT) tablet       dapagliflozin propanediol (Farxiga) 10 mg TAKE ONE TABLET BY MOUTH EVERY OTHER DAY 30 tablet 3    Entresto 49-51 mg tablet TAKE ONE TABLET BY MOUTH TWO TIMES A DAY 60 tablet 11    Jantoven 6 mg tablet TAKE ONE TABLET BY MOUTH ONCE DAILY IN THE EVENING 90 tablet 3    lamoTRIgine (LaMICtal) 200 mg tablet Take 1 tablet (200 mg) by mouth once daily in the morning.      torsemide (Demadex) 20 mg tablet Take 1 tablet (20 mg) by mouth 2 times a day. 60 tablet 11     No current facility-administered medications on file prior to visit.

## 2025-05-12 ENCOUNTER — APPOINTMENT (OUTPATIENT)
Dept: PRIMARY CARE | Facility: CLINIC | Age: 80
End: 2025-05-12
Payer: MEDICARE

## 2025-05-12 DIAGNOSIS — N18.32 STAGE 3B CHRONIC KIDNEY DISEASE (MULTI): ICD-10-CM

## 2025-05-12 DIAGNOSIS — I48.20 CHRONIC ATRIAL FIBRILLATION (MULTI): ICD-10-CM

## 2025-05-12 DIAGNOSIS — E11.9 TYPE 2 DIABETES MELLITUS WITHOUT COMPLICATION, WITHOUT LONG-TERM CURRENT USE OF INSULIN: ICD-10-CM

## 2025-05-12 DIAGNOSIS — I50.20 SYSTOLIC CONGESTIVE HEART FAILURE, UNSPECIFIED HF CHRONICITY: ICD-10-CM

## 2025-05-12 PROCEDURE — RXMED WILLOW AMBULATORY MEDICATION CHARGE

## 2025-05-12 RX ORDER — DAPAGLIFLOZIN 10 MG/1
10 TABLET, FILM COATED ORAL EVERY OTHER DAY
Qty: 90 TABLET | Refills: 3 | Status: SHIPPED | OUTPATIENT
Start: 2025-05-12

## 2025-05-12 RX ORDER — TIRZEPATIDE 2.5 MG/.5ML
2.5 INJECTION, SOLUTION SUBCUTANEOUS
Qty: 2 ML | Refills: 0 | Status: SHIPPED | OUTPATIENT
Start: 2025-05-12 | End: 2025-06-10

## 2025-05-12 RX ORDER — SACUBITRIL AND VALSARTAN 49; 51 MG/1; MG/1
1 TABLET, FILM COATED ORAL 2 TIMES DAILY
Qty: 180 TABLET | Refills: 3 | Status: SHIPPED | OUTPATIENT
Start: 2025-05-12

## 2025-05-12 NOTE — PROGRESS NOTES
[facility] Report review   E11.9 - Type 2 diabetes    Lj Valenzuela is a 80 y.o. male who was referred by Kunal Rosales MD to review current CGM data.        Patient is using CGM, Dexcom.  Report reviewed with patient.  See attached documents.  Discussed start weekly GLP-1 for improved glucose control.  Pt is agreeable.  Injection teaching completed with patient and patient spouse in office today.  Pt qualifies for [facility] assistance.  Will complete application for Mounjaro, Farxiga, Entresto.      Current DM Meds:  Current Outpatient Medications   Medication Instructions    atorvastatin (LIPITOR) 80 mg, oral, Nightly    blood sugar diagnostic (Truetrack Test) strip 2 times daily    calcitriol (Rocaltrol) 0.25 mcg capsule 1 capsule    carvedilol (COREG) 25 mg, oral, 2 times daily    cholecalciferol (Vitamin D3) 50 MCG (2000 UT) tablet     Entresto 49-51 mg tablet 1 tablet, oral, 2 times daily    Farxiga 10 mg, oral, Every other day    Jantoven 6 mg, oral, Every evening    lamoTRIgine (LAMICTAL) 200 mg, Every morning    torsemide (DEMADEX) 20 mg, oral, 2 times daily       HEMOGLOBIN A1c (% of total Hgb)   Date Value   04/02/2025 8.8 (H)     Hemoglobin A1C (%)   Date Value   07/23/2024 8.0 (H)   01/30/2024 7.4 (H)   10/17/2023 8.1 (H)     GLUCOSE (mg/dL)   Date Value   04/02/2025 177 (H)     CREATININE (mg/dL)   Date Value   04/02/2025 2.42 (H)     EGFR (mL/min/1.73m2)   Date Value   04/02/2025 26 (L)       Summary of CGM Findings:  -----------------------------  Dexcom Clarity  -----------------------------  Lj Valenzuela    YOB: 1945    Generated at: Mon, May 12, 2025 5:06 PM EDT    Reporting period: Fri Apr 25, 2025 - Thu May 8, 2025  -----------------------------  Glucose Details    Average glucose: 201 mg/dL    GMI: 8.1%    Standard deviation: 32 mg/dL    Coefficient of Variation: 16.0%  -----------------------------  Time in Range    Very High: 7%    High: 64%    In Range: 29%    Low: 0%    Very Low:  0%    Target Range   mg/dL    -----------------------------  Sensor usage    Days with data: 11/14    Time active: 95%    Avg. calibrations per day: 0.0      See attached documents    Plan:  1.  START Mounjaro 2.5mg once weekly   2.  Continue all other medications at current dosages  3.  Follow up in 1 month with clinical pharmacist      Data reviewed and evaluated by HUEY Carrero [facility]  Recommendations/report sent to Kunal Rosales MD via shared medical record

## 2025-05-13 ENCOUNTER — PHARMACY VISIT (OUTPATIENT)
Dept: PHARMACY | Facility: CLINIC | Age: 80
End: 2025-05-13
Payer: COMMERCIAL

## 2025-05-13 ENCOUNTER — TELEMEDICINE (OUTPATIENT)
Dept: PHARMACY | Facility: HOSPITAL | Age: 80
End: 2025-05-13
Payer: MEDICARE

## 2025-05-13 DIAGNOSIS — E11.9 TYPE 2 DIABETES MELLITUS WITHOUT COMPLICATION, WITHOUT LONG-TERM CURRENT USE OF INSULIN: Primary | ICD-10-CM

## 2025-05-13 RX ORDER — TIRZEPATIDE 5 MG/.5ML
5 INJECTION, SOLUTION SUBCUTANEOUS
Qty: 2 ML | Refills: 1 | Status: SHIPPED | OUTPATIENT
Start: 2025-06-02 | End: 2025-07-28

## 2025-05-13 NOTE — PROGRESS NOTES
MEDICATION MANAGEMENT    Lj Valenzuela is a 80 y.o. male who was referred by Knual Rosales MD to complete medication reconciliation and review with the clinical pharmacist.  A comprehensive medication review was completed with the patient via telephone today.  With patient's permission, this is a Telemedicine visit with audio. Provider located at office address. Patient located at their home address. All issues as documented below were discussed and addressed but limited physical exam was performed. If it was felt that the patient should be evaluated via face-to-face office appointment(s) they were directed to appropriate location.  Patient reports financial barrier with current medication.      MEDICATION HISTORY  Allergies[1]  Medications Ordered Prior to Encounter[2]     Patient Assistance Screening (VAF)  Patient verbally reports monthly or yearly income which is less than 400% federal poverty level.  Application for program has been submitted for the following medications:     Mounjaro, Entresto, Farxiga     Patient has been informed that program team will be reaching out to them to discuss necessary documentation, instructed to answer phone/return voicemail.   Patient aware this process may take up to 6 weeks.   If approved medication must be filled through [facility] pharmacy and may be picked up or mailed to patient.       LABS  There were no vitals taken for this visit.   Lab Results   Component Value Date    HGBA1C 8.8 (H) 04/02/2025    HGBA1C 8.0 (H) 07/23/2024    HGBA1C 7.4 (H) 01/30/2024     Lab Results   Component Value Date    BILITOT 1.1 04/02/2025    CALCIUM 9.6 04/02/2025    CO2 28 04/02/2025     04/02/2025    CREATININE 2.42 (H) 04/02/2025    GLUCOSE 177 (H) 04/02/2025    ALKPHOS 68 04/02/2025    K 4.6 04/02/2025    PROT 6.9 04/02/2025     04/02/2025    AST 15 04/02/2025    ALT 15 04/02/2025    BUN 47 (H) 04/02/2025    ANIONGAP 12 04/02/2025    MG 2.71 (H) 11/26/2024    PHOS 4.5  12/27/2024    ALBUMIN 4.6 04/02/2025     Lab Results   Component Value Date    TRIG 186 (H) 04/02/2025    CHOL 119 04/02/2025    LDLCALC 67 04/02/2025    HDL 27 (L) 04/02/2025         Assessment/Plan    Comments/Recommendations to PCP:  Reconciled medications with patient via telephone today.  Reviewed instructions, MOA, SE and dose for Mounjaro, Entresto, Farxiga    Patient verbalizes understanding regarding plan of care and all questions answered   4.   Pt will follow up with PCP as scheduled    Mounjaro education provided.  Counseled patient on Mounjaro MOA, expectations, side effects, duration of therapy, administration, and monitoring parameters.  Provided detailed dosing and administration counseling to ensure proper technique.   Reviewed Mounjaro titration schedule, starting with 2.5 mg once weekly to a goal of 15 mg once weekly if tolerated  Counseled patient on the benefits of GLP-1ra glycemic control and weight loss.  Reviewed storage requirements of Mounjaro when not in use, and when to administer the medication if a dose is missed.  Advised patient that they may experience improved satiety after meals and portion sizes of meals may be reduced as doses of Mounjaro increase.           Helena Carrero, [facility]    Verbal consent to manage patient's drug therapy was obtained from the patient and/or an individual authorized to act on behalf of a patient. They were informed they may decline to participate or withdraw from participation in pharmacy services at any time.       [1] No Known Allergies  [2]   Current Outpatient Medications on File Prior to Visit   Medication Sig Dispense Refill    atorvastatin (Lipitor) 80 mg tablet TAKE ONE TABLET BY MOUTH ONCE DAILY AT BEDTIME 90 tablet 3    blood sugar diagnostic (Truetrack Test) strip 2 times a day.      calcitriol (Rocaltrol) 0.25 mcg capsule 1 capsule (0.25 mcg).      carvedilol (Coreg) 25 mg tablet Take 1 tablet (25 mg) by mouth 2 times a day. 180 tablet 3     cholecalciferol (Vitamin D3) 50 MCG (2000 UT) tablet       dapagliflozin propanediol (Farxiga) 10 mg tablet Take 1 tablet (10 mg) by mouth every other day. 90 tablet 3    Jantoven 6 mg tablet TAKE ONE TABLET BY MOUTH ONCE DAILY IN THE EVENING 90 tablet 3    lamoTRIgine (LaMICtal) 200 mg tablet Take 1 tablet (200 mg) by mouth once daily in the morning.      sacubitriL-valsartan (Entresto) 49-51 mg tablet Take 1 tablet by mouth 2 times a day. 180 tablet 3    tirzepatide (Mounjaro) 2.5 mg/0.5 mL pen injector Inject 2.5 mg under the skin every 7 days for 28 days. 2 mL 0    torsemide (Demadex) 20 mg tablet Take 1 tablet (20 mg) by mouth 2 times a day. 60 tablet 11    [DISCONTINUED] dapagliflozin propanediol (Farxiga) 10 mg TAKE ONE TABLET BY MOUTH EVERY OTHER DAY 30 tablet 3    [DISCONTINUED] Entresto 49-51 mg tablet TAKE ONE TABLET BY MOUTH TWO TIMES A DAY 60 tablet 11     No current facility-administered medications on file prior to visit.

## 2025-05-13 NOTE — PROGRESS NOTES
Please review for internal [employer] (VAF) eligibility. Prescriptions have been sent to [facility].     Lj Valenzuela  1945   [id number]  [phone]   [email or web address]  Delivery Preference (if known): MAIL  Priority:  Hold Medication until [facility] approved/denied  Filing Status: Patient does file taxes  Household size: 2  Financial Documents To Be Collected By: Documents attached to email  Medication(s):    Mounjaro, Entresto, Farxiga     *I have confirmed the above medications are listed on the current VAF formulary: [email or web address]    I acknowledge the [facility] staff will further reach out to the patient to confirm additional details as needed, including but not limited to collecting financial documentation, confirming delivery information, obtaining payment method for non-VAF medications.      Helena Carrero [facility]

## 2025-05-19 ENCOUNTER — ANTICOAGULATION - WARFARIN VISIT (OUTPATIENT)
Dept: CARDIOLOGY | Facility: CLINIC | Age: 80
End: 2025-05-19
Payer: MEDICARE

## 2025-05-19 DIAGNOSIS — I48.91 ATRIAL FIBRILLATION, UNSPECIFIED TYPE (MULTI): ICD-10-CM

## 2025-05-19 DIAGNOSIS — Z79.01 LONG TERM (CURRENT) USE OF ANTICOAGULANTS: ICD-10-CM

## 2025-05-19 DIAGNOSIS — I48.20 CHRONIC ATRIAL FIBRILLATION (MULTI): ICD-10-CM

## 2025-05-19 LAB
POC INR: 2.5 (ref 0.9–1.1)
POC PROTHROMBIN TIME: ABNORMAL (ref 9.3–12.5)

## 2025-05-19 PROCEDURE — RXMED WILLOW AMBULATORY MEDICATION CHARGE

## 2025-05-19 PROCEDURE — 99211 OFF/OP EST MAY X REQ PHY/QHP: CPT | Performed by: INTERNAL MEDICINE

## 2025-05-19 PROCEDURE — 85610 PROTHROMBIN TIME: CPT | Mod: QW

## 2025-05-19 NOTE — PROGRESS NOTES
Location: [facility] - [address] [place] [address] [phone]     Referring Physician: DR STAUFFER  Enrollment/ Re-enrollment date: 2025  INR Goal: 2.0-3.0  INR monitoring is per [facility] protocol.  Anticoagulation Medication: warfarin  Indication: Atrial Fibrillation/Atrial Flutter    Subjective   Bleeding signs/symptoms: No    Bruising: No   Major bleeding event: No  Thrombosis signs/symptoms: No  Thromboembolic event: No  Missed doses: No  Extra doses: No  Medication changes: No  Dietary changes: No  Change in health: No  Change in activity: No  Alcohol: No  Other concerns: No    Upcoming Surgeries:  Does the Patient Have any upcoming surgeries that require interruption in anticoagulation therapy? no  Does the patient require bridging? no      Anticoagulation Summary  As of 2025      INR goal:  2.0-3.0   TTR:  84.2% (1.7 y)   INR used for dosin.50 (2025)   Weekly warfarin total:  39 mg               Assessment/Plan   Therapeutic     1. New dose: no change    2. Next INR: 1 month    Education provided to patient during the visit:  Patient instructed to call in interim with questions, concerns and changes.   Patient educated on interactions between medications and warfarin.   Patient educated on dietary consistency in vitamin k consumption.   Patient educated on affects of alcohol consumption while taking warfarin.   Patient educated on signs of bleeding/clotting.   Patient educated on compliance with dosing, follow up appointments, and prescribed plan of care.

## 2025-05-21 ENCOUNTER — PHARMACY VISIT (OUTPATIENT)
Dept: PHARMACY | Facility: CLINIC | Age: 80
End: 2025-05-21
Payer: COMMERCIAL

## 2025-05-27 PROCEDURE — RXMED WILLOW AMBULATORY MEDICATION CHARGE

## 2025-05-29 ENCOUNTER — PHARMACY VISIT (OUTPATIENT)
Dept: PHARMACY | Facility: CLINIC | Age: 80
End: 2025-05-29
Payer: COMMERCIAL

## 2025-06-02 PROCEDURE — RXMED WILLOW AMBULATORY MEDICATION CHARGE

## 2025-06-03 ENCOUNTER — PHARMACY VISIT (OUTPATIENT)
Dept: PHARMACY | Facility: CLINIC | Age: 80
End: 2025-06-03
Payer: COMMERCIAL

## 2025-06-10 DIAGNOSIS — E11.9 TYPE 2 DIABETES MELLITUS WITHOUT COMPLICATION, WITHOUT LONG-TERM CURRENT USE OF INSULIN: ICD-10-CM

## 2025-06-10 RX ORDER — DAPAGLIFLOZIN 10 MG/1
10 TABLET, FILM COATED ORAL EVERY OTHER DAY
Qty: 45 TABLET | Refills: 3 | Status: SHIPPED | OUTPATIENT
Start: 2025-06-10

## 2025-06-13 ENCOUNTER — APPOINTMENT (OUTPATIENT)
Dept: PRIMARY CARE | Facility: CLINIC | Age: 80
End: 2025-06-13
Payer: MEDICARE

## 2025-06-13 VITALS — HEIGHT: 68 IN | BODY MASS INDEX: 27.96 KG/M2 | WEIGHT: 184.5 LBS

## 2025-06-13 DIAGNOSIS — E11.9 TYPE 2 DIABETES MELLITUS WITHOUT COMPLICATION, WITHOUT LONG-TERM CURRENT USE OF INSULIN: ICD-10-CM

## 2025-06-13 LAB — POC HEMOGLOBIN A1C: 7.5 % (ref 4.2–6.5)

## 2025-06-13 PROCEDURE — 83036 HEMOGLOBIN GLYCOSYLATED A1C: CPT | Performed by: FAMILY MEDICINE

## 2025-06-13 RX ORDER — TIRZEPATIDE 5 MG/.5ML
5 INJECTION, SOLUTION SUBCUTANEOUS
Qty: 2 ML | Refills: 2 | Status: SHIPPED | OUTPATIENT
Start: 2025-06-13 | End: 2025-09-05

## 2025-06-16 ENCOUNTER — ANTICOAGULATION - WARFARIN VISIT (OUTPATIENT)
Dept: CARDIOLOGY | Facility: CLINIC | Age: 80
End: 2025-06-16
Payer: MEDICARE

## 2025-06-16 DIAGNOSIS — Z79.01 LONG TERM (CURRENT) USE OF ANTICOAGULANTS: ICD-10-CM

## 2025-06-16 DIAGNOSIS — I48.91 ATRIAL FIBRILLATION, UNSPECIFIED TYPE (MULTI): ICD-10-CM

## 2025-06-16 DIAGNOSIS — I48.20 CHRONIC ATRIAL FIBRILLATION (MULTI): ICD-10-CM

## 2025-06-16 LAB
POC INR: 2.9 (ref 0.9–1.1)
POC PROTHROMBIN TIME: ABNORMAL (ref 9.3–12.5)

## 2025-06-16 PROCEDURE — 99211 OFF/OP EST MAY X REQ PHY/QHP: CPT | Performed by: INTERNAL MEDICINE

## 2025-06-16 PROCEDURE — 85610 PROTHROMBIN TIME: CPT | Mod: QW

## 2025-06-16 NOTE — PROGRESS NOTES
Location: [facility] - [address] [place] [address] [phone]     Referring Physician: DR STAUFFER  Enrollment/ Re-enrollment date: 2025  INR Goal: 2.0-3.0  INR monitoring is per [facility] protocol.  Anticoagulation Medication: warfarin  Indication: Atrial Fibrillation/Atrial Flutter    Subjective   Bleeding signs/symptoms: No    Bruising: No   Major bleeding event: No  Thrombosis signs/symptoms: No  Thromboembolic event: No  Missed doses: No  Extra doses: No  Medication changes: No  Dietary changes: No  Change in health: No  Change in activity: No  Alcohol: No  Other concerns: No    Upcoming Surgeries:  Does the Patient Have any upcoming surgeries that require interruption in anticoagulation therapy? no  Does the patient require bridging? no      Anticoagulation Summary  As of 2025      INR goal:  2.0-3.0   TTR:  84.9% (1.7 y)   INR used for dosin.90 (2025)   Weekly warfarin total:  39 mg               Assessment/Plan   Therapeutic     1. New dose: no change    2. Next INR: 1 month    Education provided to patient during the visit:  Patient instructed to call in interim with questions, concerns and changes.   Patient educated on interactions between medications and warfarin.   Patient educated on dietary consistency in vitamin k consumption.   Patient educated on affects of alcohol consumption while taking warfarin.   Patient educated on signs of bleeding/clotting.   Patient educated on compliance with dosing, follow up appointments, and prescribed plan of care.

## 2025-06-17 ENCOUNTER — ANCILLARY PROCEDURE (OUTPATIENT)
Facility: CLINIC | Age: 80
End: 2025-06-17
Payer: MEDICARE

## 2025-06-17 DIAGNOSIS — I48.0 PAF (PAROXYSMAL ATRIAL FIBRILLATION) (MULTI): ICD-10-CM

## 2025-06-17 DIAGNOSIS — I25.5 ISCHEMIC CARDIOMYOPATHY: ICD-10-CM

## 2025-06-17 DIAGNOSIS — Z95.810 PRESENCE OF AUTOMATIC CARDIOVERTER/DEFIBRILLATOR (AICD): ICD-10-CM

## 2025-06-17 PROCEDURE — 93280 PM DEVICE PROGR EVAL DUAL: CPT

## 2025-06-17 PROCEDURE — 93280 PM DEVICE PROGR EVAL DUAL: CPT | Performed by: INTERNAL MEDICINE

## 2025-07-07 PROCEDURE — RXMED WILLOW AMBULATORY MEDICATION CHARGE

## 2025-07-08 ENCOUNTER — PHARMACY VISIT (OUTPATIENT)
Dept: PHARMACY | Facility: CLINIC | Age: 80
End: 2025-07-08
Payer: COMMERCIAL

## 2025-07-14 ENCOUNTER — ANTICOAGULATION - WARFARIN VISIT (OUTPATIENT)
Dept: CARDIOLOGY | Facility: CLINIC | Age: 80
End: 2025-07-14
Payer: MEDICARE

## 2025-07-14 DIAGNOSIS — I48.20 CHRONIC ATRIAL FIBRILLATION (MULTI): ICD-10-CM

## 2025-07-14 DIAGNOSIS — Z79.01 LONG TERM (CURRENT) USE OF ANTICOAGULANTS: ICD-10-CM

## 2025-07-14 DIAGNOSIS — I48.91 ATRIAL FIBRILLATION, UNSPECIFIED TYPE (MULTI): ICD-10-CM

## 2025-07-14 LAB
POC INR: 2 (ref 0.9–1.1)
POC PROTHROMBIN TIME: ABNORMAL (ref 9.3–12.5)

## 2025-07-14 PROCEDURE — 99211 OFF/OP EST MAY X REQ PHY/QHP: CPT | Performed by: INTERNAL MEDICINE

## 2025-07-14 PROCEDURE — 85610 PROTHROMBIN TIME: CPT | Mod: QW

## 2025-07-14 NOTE — PROGRESS NOTES
Location: [facility] - [address] [place] [address] [phone]     Referring Physician: DR STAUFFER  Enrollment/ Re-enrollment date: 2025  INR Goal: 2.0-3.0  INR monitoring is per [facility] protocol.  Anticoagulation Medication: warfarin  Indication: Atrial Fibrillation/Atrial Flutter    Subjective   Bleeding signs/symptoms: No    Bruising: No   Major bleeding event: No  Thrombosis signs/symptoms: No  Thromboembolic event: No  Missed doses: No  Extra doses: No  Medication changes: No  Dietary changes: No  Change in health: No  Change in activity: No  Alcohol: No  Other concerns: No    Upcoming Surgeries:  Does the Patient Have any upcoming surgeries that require interruption in anticoagulation therapy? no  Does the patient require bridging? no      Anticoagulation Summary  As of 2025      INR goal:  2.0-3.0   TTR:  85.6% (1.8 y)   INR used for dosin.00 (2025)   Weekly warfarin total:  39 mg             Assessment/Plan   Therapeutic     1. New dose: no change    2. Next INR: 1 month      Education provided to patient during the visit:  Patient instructed to call in interim with questions, concerns and changes.   Patient educated on interactions between medications and warfarin.   Patient educated on dietary consistency in vitamin k consumption.   Patient educated on affects of alcohol consumption while taking warfarin.   Patient educated on signs of bleeding/clotting.   Patient educated on compliance with dosing, follow up appointments, and prescribed plan of care.

## 2025-08-05 ENCOUNTER — PHARMACY VISIT (OUTPATIENT)
Dept: PHARMACY | Facility: CLINIC | Age: 80
End: 2025-08-05
Payer: COMMERCIAL

## 2025-08-05 PROCEDURE — RXMED WILLOW AMBULATORY MEDICATION CHARGE

## 2025-08-11 ENCOUNTER — ANTICOAGULATION - WARFARIN VISIT (OUTPATIENT)
Dept: CARDIOLOGY | Facility: CLINIC | Age: 80
End: 2025-08-11
Payer: MEDICARE

## 2025-08-11 DIAGNOSIS — I48.91 ATRIAL FIBRILLATION, UNSPECIFIED TYPE (MULTI): ICD-10-CM

## 2025-08-11 DIAGNOSIS — Z79.01 LONG TERM (CURRENT) USE OF ANTICOAGULANTS: ICD-10-CM

## 2025-08-11 DIAGNOSIS — I48.20 CHRONIC ATRIAL FIBRILLATION (MULTI): ICD-10-CM

## 2025-08-11 LAB
POC INR: 2.1 (ref 0.9–1.1)
POC PROTHROMBIN TIME: ABNORMAL (ref 9.3–12.5)

## 2025-08-11 PROCEDURE — 99211 OFF/OP EST MAY X REQ PHY/QHP: CPT | Performed by: INTERNAL MEDICINE

## 2025-08-11 PROCEDURE — 85610 PROTHROMBIN TIME: CPT | Mod: QW | Performed by: INTERNAL MEDICINE

## 2025-08-15 ENCOUNTER — APPOINTMENT (OUTPATIENT)
Dept: PRIMARY CARE | Facility: CLINIC | Age: 80
End: 2025-08-15
Payer: MEDICARE

## 2025-08-15 VITALS — BODY MASS INDEX: 27.49 KG/M2 | WEIGHT: 181.4 LBS | HEIGHT: 68 IN

## 2025-08-15 DIAGNOSIS — I50.20 SYSTOLIC CONGESTIVE HEART FAILURE, UNSPECIFIED HF CHRONICITY: ICD-10-CM

## 2025-08-15 DIAGNOSIS — N18.32 STAGE 3B CHRONIC KIDNEY DISEASE (MULTI): ICD-10-CM

## 2025-08-15 DIAGNOSIS — I48.20 CHRONIC ATRIAL FIBRILLATION (MULTI): ICD-10-CM

## 2025-08-15 DIAGNOSIS — E11.9 TYPE 2 DIABETES MELLITUS WITHOUT COMPLICATION, WITHOUT LONG-TERM CURRENT USE OF INSULIN: Primary | ICD-10-CM

## 2025-08-15 PROCEDURE — RXMED WILLOW AMBULATORY MEDICATION CHARGE

## 2025-08-15 RX ORDER — TIRZEPATIDE 5 MG/.5ML
5 INJECTION, SOLUTION SUBCUTANEOUS
Qty: 6 ML | Refills: 1 | Status: SHIPPED | OUTPATIENT
Start: 2025-08-15 | End: 2026-01-30

## 2025-08-15 RX ORDER — DAPAGLIFLOZIN 5 MG/1
5 TABLET, FILM COATED ORAL DAILY
Qty: 90 TABLET | Refills: 1 | Status: SHIPPED | OUTPATIENT
Start: 2025-08-15 | End: 2026-02-11

## 2025-08-18 ENCOUNTER — PHARMACY VISIT (OUTPATIENT)
Dept: PHARMACY | Facility: CLINIC | Age: 80
End: 2025-08-18
Payer: COMMERCIAL

## 2025-08-21 ENCOUNTER — APPOINTMENT (OUTPATIENT)
Dept: CARDIOLOGY | Facility: CLINIC | Age: 80
End: 2025-08-21
Payer: MEDICARE

## 2025-08-21 ASSESSMENT — LIFESTYLE VARIABLES
AUDIT TOTAL SCORE: 0
HAVE YOU OR SOMEONE ELSE BEEN INJURED AS A RESULT OF YOUR DRINKING: NO
HAS A RELATIVE, FRIEND, DOCTOR, OR ANOTHER HEALTH PROFESSIONAL EXPRESSED CONCERN ABOUT YOUR DRINKING OR SUGGESTED YOU CUT DOWN: NO
HOW OFTEN DO YOU HAVE SIX OR MORE DRINKS ON ONE OCCASION: NEVER
AUDIT-C TOTAL SCORE: 0
SKIP TO QUESTIONS 9-10: 1
HOW OFTEN DO YOU HAVE A DRINK CONTAINING ALCOHOL: NEVER
HOW MANY STANDARD DRINKS CONTAINING ALCOHOL DO YOU HAVE ON A TYPICAL DAY: PATIENT DOES NOT DRINK

## 2025-08-21 ASSESSMENT — ENCOUNTER SYMPTOMS
LOSS OF SENSATION IN FEET: 0
OCCASIONAL FEELINGS OF UNSTEADINESS: 0
DEPRESSION: 0

## 2025-08-21 ASSESSMENT — PAIN SCALES - GENERAL: PAINLEVEL_OUTOF10: 0-NO PAIN

## 2025-08-26 PROCEDURE — RXMED WILLOW AMBULATORY MEDICATION CHARGE

## 2025-08-27 ENCOUNTER — PHARMACY VISIT (OUTPATIENT)
Dept: PHARMACY | Facility: CLINIC | Age: 80
End: 2025-08-27
Payer: COMMERCIAL

## 2025-10-13 ENCOUNTER — APPOINTMENT (OUTPATIENT)
Dept: PRIMARY CARE | Facility: CLINIC | Age: 80
End: 2025-10-13
Payer: MEDICARE

## 2026-01-16 ENCOUNTER — APPOINTMENT (OUTPATIENT)
Dept: PRIMARY CARE | Facility: CLINIC | Age: 81
End: 2026-01-16
Payer: MEDICARE

## 2026-02-23 ENCOUNTER — APPOINTMENT (OUTPATIENT)
Dept: CARDIOLOGY | Facility: CLINIC | Age: 81
End: 2026-02-23
Payer: MEDICARE

## (undated) DEVICE — PAD, ELECTRODE DEFIB PADPRO ADULT STRL W/ADAPTER